# Patient Record
Sex: FEMALE | Race: WHITE | NOT HISPANIC OR LATINO
[De-identification: names, ages, dates, MRNs, and addresses within clinical notes are randomized per-mention and may not be internally consistent; named-entity substitution may affect disease eponyms.]

---

## 2019-02-20 ENCOUNTER — RESULT REVIEW (OUTPATIENT)
Age: 24
End: 2019-02-20

## 2020-11-30 ENCOUNTER — APPOINTMENT (OUTPATIENT)
Dept: SURGICAL ONCOLOGY | Facility: CLINIC | Age: 25
End: 2020-11-30
Payer: COMMERCIAL

## 2020-11-30 VITALS
HEART RATE: 74 BPM | OXYGEN SATURATION: 98 % | BODY MASS INDEX: 19.29 KG/M2 | RESPIRATION RATE: 16 BRPM | TEMPERATURE: 98.3 F | WEIGHT: 120 LBS | DIASTOLIC BLOOD PRESSURE: 69 MMHG | HEIGHT: 66 IN | SYSTOLIC BLOOD PRESSURE: 104 MMHG

## 2020-11-30 PROCEDURE — 99072 ADDL SUPL MATRL&STAF TM PHE: CPT

## 2020-11-30 PROCEDURE — 99203 OFFICE O/P NEW LOW 30 MIN: CPT

## 2020-11-30 NOTE — HISTORY OF PRESENT ILLNESS
[de-identified] : 26yo previously healthy female that presented with acute onset LUQ back pain.  She denies trauma but is an avid exerciser and runner.  She underwent an evalution with Ultrasound, CT and MRI.  Imaging revealed a splenic cyst just under 5cm, but with signs of recent hemmorhage.  No extravasation.  Her pain is still somewhat severe but has dissipated somewhat in the past week.  She denies nausea or weight loss.  She also denies foreign travel, fevers or chills.

## 2020-11-30 NOTE — REASON FOR VISIT
[Initial Consultation] : an initial consultation for [Parent] : parent [FreeTextEntry2] : Splenic Cyst

## 2020-11-30 NOTE — ASSESSMENT
[FreeTextEntry1] : 24 yo previously healthy female that presents with a severely symptomatic splenic cyst.  This cyst appears benign and I have no suspician that it is parasitic based on history and imaging.  In reviewing imaging, my guess is that she had minor hemmorhage that resulted in rapid increase in size and pain.  I explained that the indications for surgery are size >5cm or symptoms.  Given that she clearly is having symptoms and likely had recent bleeding, I think she should under fenestration of the cyst.  We discussed that cyst aspiration has a very high recurrence rate.  We also discussed that it is possible that we would need to convert to an open operation or remove the entire spleen, depending on what we found at the time of surgery.  The cyst is located very posterior but I think a fenestration is possible.\par I spent 30 minutes with the patient and her mother.  We are all in agreement with proceeding.  All of their questions were answered and we will schedule surgery soon, since she is symptomatic.

## 2020-11-30 NOTE — HISTORY OF PRESENT ILLNESS
[de-identified] : 26yo previously healthy female that presented with acute onset LUQ back pain.  She denies trauma but is an avid exerciser and runner.  She underwent an evalution with Ultrasound, CT and MRI.  Imaging revealed a splenic cyst just under 5cm, but with signs of recent hemmorhage.  No extravasation.  Her pain is still somewhat severe but has dissipated somewhat in the past week.  She denies nausea or weight loss.  She also denies foreign travel, fevers or chills.

## 2020-12-02 ENCOUNTER — OUTPATIENT (OUTPATIENT)
Dept: OUTPATIENT SERVICES | Facility: HOSPITAL | Age: 25
LOS: 1 days | End: 2020-12-02

## 2020-12-02 VITALS
SYSTOLIC BLOOD PRESSURE: 117 MMHG | WEIGHT: 117.95 LBS | DIASTOLIC BLOOD PRESSURE: 62 MMHG | TEMPERATURE: 99 F | HEIGHT: 66 IN | OXYGEN SATURATION: 98 % | RESPIRATION RATE: 14 BRPM | HEART RATE: 60 BPM

## 2020-12-02 DIAGNOSIS — D73.4 CYST OF SPLEEN: ICD-10-CM

## 2020-12-02 DIAGNOSIS — Z98.890 OTHER SPECIFIED POSTPROCEDURAL STATES: Chronic | ICD-10-CM

## 2020-12-02 LAB
BLD GP AB SCN SERPL QL: NEGATIVE — SIGNIFICANT CHANGE UP
HCG SERPL-ACNC: < 5 MIU/ML — SIGNIFICANT CHANGE UP
RH IG SCN BLD-IMP: POSITIVE — SIGNIFICANT CHANGE UP

## 2020-12-02 RX ORDER — SODIUM CHLORIDE 9 MG/ML
1000 INJECTION, SOLUTION INTRAVENOUS
Refills: 0 | Status: DISCONTINUED | OUTPATIENT
Start: 2020-12-07 | End: 2020-12-08

## 2020-12-02 NOTE — H&P PST ADULT - NSANTHOSAYNRD_GEN_A_CORE
No. GEORGI screening performed.  STOP BANG Legend: 0-2 = LOW Risk; 3-4 = INTERMEDIATE Risk; 5-8 = HIGH Risk

## 2020-12-02 NOTE — H&P PST ADULT - GASTROINTESTINAL DETAILS
no organomegaly/no distention/soft/no bruit/no guarding/no rebound tenderness/no rigidity/no masses palpable/nontender/bowel sounds normal

## 2020-12-02 NOTE — H&P PST ADULT - NEGATIVE NEUROLOGICAL SYMPTOMS
no loss of consciousness/no tremors/no hemiparesis/no facial palsy/no headache/no transient paralysis/no weakness/no paresthesias/no syncope/no confusion/no vertigo/no difficulty walking/no focal seizures/no loss of sensation

## 2020-12-02 NOTE — H&P PST ADULT - RS GEN PE MLT RESP DETAILS PC
clear to auscultation bilaterally/breath sounds equal/respirations non-labored/no intercostal retractions/no rales/good air movement/no rhonchi/no wheezes/no chest wall tenderness

## 2020-12-02 NOTE — H&P PST ADULT - NEGATIVE GENERAL GENITOURINARY SYMPTOMS
no hematuria/no flank pain R/no bladder infections/no urinary hesitancy/no dysuria/no flank pain L/normal urinary frequency

## 2020-12-02 NOTE — H&P PST ADULT - NEGATIVE CARDIOVASCULAR SYMPTOMS
no palpitations/no dyspnea on exertion/no orthopnea/no paroxysmal nocturnal dyspnea/no claudication/no chest pain/no peripheral edema

## 2020-12-02 NOTE — H&P PST ADULT - NEGATIVE GENERAL SYMPTOMS
no anorexia/no weight gain/no polyphagia/no polydipsia/no malaise/no fever/no chills/no sweating/no weight loss/no polyuria/no fatigue

## 2020-12-02 NOTE — H&P PST ADULT - NSICDXPROBLEM_GEN_ALL_CORE_FT
PROBLEM DIAGNOSES  Problem: Cyst of spleen  Assessment and Plan: Pt scheduled for robotic assisted splenic cyst fenestration, possible splenectomy on 12/7/2020.  labs done results pending, labs results from [facility] in chart.  Preop covid testing scheduled.  urine cup provided. Hibclens provided, written and verbal instructions given with teach back, pt able to verbalize understanding.  Preop teaching done, pt able to verbalize undrestanding.   meds day fo procedure pepcid

## 2020-12-02 NOTE — H&P PST ADULT - NEGATIVE BREAST SYMPTOMS
no breast lump L/no breast tenderness R/no breast lump R/no nipple discharge L/no nipple discharge R/no breast tenderness L

## 2020-12-04 ENCOUNTER — APPOINTMENT (OUTPATIENT)
Dept: SURGICAL ONCOLOGY | Facility: CLINIC | Age: 25
End: 2020-12-04

## 2020-12-04 DIAGNOSIS — Z01.818 ENCOUNTER FOR OTHER PREPROCEDURAL EXAMINATION: ICD-10-CM

## 2020-12-05 LAB — SARS-COV-2 N GENE NPH QL NAA+PROBE: NOT DETECTED

## 2020-12-06 ENCOUNTER — TRANSCRIPTION ENCOUNTER (OUTPATIENT)
Age: 25
End: 2020-12-06

## 2020-12-07 ENCOUNTER — RESULT REVIEW (OUTPATIENT)
Age: 25
End: 2020-12-07

## 2020-12-07 ENCOUNTER — INPATIENT (INPATIENT)
Facility: HOSPITAL | Age: 25
LOS: 1 days | Discharge: HOME | End: 2020-12-09
Attending: SURGERY | Admitting: SURGERY
Payer: COMMERCIAL

## 2020-12-07 ENCOUNTER — APPOINTMENT (OUTPATIENT)
Dept: SURGICAL ONCOLOGY | Facility: HOSPITAL | Age: 25
End: 2020-12-07

## 2020-12-07 VITALS
OXYGEN SATURATION: 97 % | RESPIRATION RATE: 17 BRPM | DIASTOLIC BLOOD PRESSURE: 81 MMHG | HEART RATE: 71 BPM | SYSTOLIC BLOOD PRESSURE: 146 MMHG | TEMPERATURE: 99 F

## 2020-12-07 DIAGNOSIS — D73.4 CYST OF SPLEEN: ICD-10-CM

## 2020-12-07 DIAGNOSIS — Z98.890 OTHER SPECIFIED POSTPROCEDURAL STATES: Chronic | ICD-10-CM

## 2020-12-07 LAB — HCG UR QL: NEGATIVE — SIGNIFICANT CHANGE UP

## 2020-12-07 PROCEDURE — S2900 ROBOTIC SURGICAL SYSTEM: CPT | Mod: NC

## 2020-12-07 PROCEDURE — 88305 TISSUE EXAM BY PATHOLOGIST: CPT | Mod: 26

## 2020-12-07 PROCEDURE — 38101 REMOVAL OF SPLEEN PARTIAL: CPT

## 2020-12-07 RX ORDER — OXYCODONE HYDROCHLORIDE 5 MG/1
5 TABLET ORAL EVERY 6 HOURS
Refills: 0 | Status: DISCONTINUED | OUTPATIENT
Start: 2020-12-07 | End: 2020-12-07

## 2020-12-07 RX ORDER — ACETAMINOPHEN 500 MG
650 TABLET ORAL EVERY 6 HOURS
Refills: 0 | Status: DISCONTINUED | OUTPATIENT
Start: 2020-12-07 | End: 2020-12-07

## 2020-12-07 RX ORDER — OXYCODONE HYDROCHLORIDE 5 MG/1
5 TABLET ORAL EVERY 4 HOURS
Refills: 0 | Status: DISCONTINUED | OUTPATIENT
Start: 2020-12-07 | End: 2020-12-07

## 2020-12-07 RX ORDER — OXYCODONE HYDROCHLORIDE 5 MG/1
5 TABLET ORAL EVERY 6 HOURS
Refills: 0 | Status: DISCONTINUED | OUTPATIENT
Start: 2020-12-07 | End: 2020-12-09

## 2020-12-07 RX ORDER — FENTANYL CITRATE 50 UG/ML
50 INJECTION INTRAVENOUS
Refills: 0 | Status: DISCONTINUED | OUTPATIENT
Start: 2020-12-07 | End: 2020-12-07

## 2020-12-07 RX ORDER — ACETAMINOPHEN 500 MG
650 TABLET ORAL EVERY 6 HOURS
Refills: 0 | Status: DISCONTINUED | OUTPATIENT
Start: 2020-12-07 | End: 2020-12-08

## 2020-12-07 RX ORDER — KETOROLAC TROMETHAMINE 30 MG/ML
15 SYRINGE (ML) INJECTION EVERY 6 HOURS
Refills: 0 | Status: DISCONTINUED | OUTPATIENT
Start: 2020-12-07 | End: 2020-12-07

## 2020-12-07 RX ORDER — KETOROLAC TROMETHAMINE 30 MG/ML
15 SYRINGE (ML) INJECTION ONCE
Refills: 0 | Status: DISCONTINUED | OUTPATIENT
Start: 2020-12-07 | End: 2020-12-07

## 2020-12-07 RX ORDER — OXYCODONE HYDROCHLORIDE 5 MG/1
5 TABLET ORAL ONCE
Refills: 0 | Status: DISCONTINUED | OUTPATIENT
Start: 2020-12-07 | End: 2020-12-07

## 2020-12-07 RX ORDER — HYDROMORPHONE HYDROCHLORIDE 2 MG/ML
0.5 INJECTION INTRAMUSCULAR; INTRAVENOUS; SUBCUTANEOUS
Refills: 0 | Status: DISCONTINUED | OUTPATIENT
Start: 2020-12-07 | End: 2020-12-07

## 2020-12-07 RX ORDER — ENOXAPARIN SODIUM 100 MG/ML
40 INJECTION SUBCUTANEOUS DAILY
Refills: 0 | Status: DISCONTINUED | OUTPATIENT
Start: 2020-12-08 | End: 2020-12-09

## 2020-12-07 RX ADMIN — OXYCODONE HYDROCHLORIDE 5 MILLIGRAM(S): 5 TABLET ORAL at 13:30

## 2020-12-07 RX ADMIN — Medication 15 MILLIGRAM(S): at 23:57

## 2020-12-07 RX ADMIN — OXYCODONE HYDROCHLORIDE 5 MILLIGRAM(S): 5 TABLET ORAL at 13:00

## 2020-12-07 RX ADMIN — Medication 650 MILLIGRAM(S): at 17:30

## 2020-12-07 RX ADMIN — HYDROMORPHONE HYDROCHLORIDE 0.5 MILLIGRAM(S): 2 INJECTION INTRAMUSCULAR; INTRAVENOUS; SUBCUTANEOUS at 13:30

## 2020-12-07 RX ADMIN — OXYCODONE HYDROCHLORIDE 5 MILLIGRAM(S): 5 TABLET ORAL at 16:53

## 2020-12-07 RX ADMIN — HYDROMORPHONE HYDROCHLORIDE 0.5 MILLIGRAM(S): 2 INJECTION INTRAMUSCULAR; INTRAVENOUS; SUBCUTANEOUS at 13:15

## 2020-12-07 RX ADMIN — FENTANYL CITRATE 50 MICROGRAM(S): 50 INJECTION INTRAVENOUS at 13:45

## 2020-12-07 RX ADMIN — OXYCODONE HYDROCHLORIDE 5 MILLIGRAM(S): 5 TABLET ORAL at 19:56

## 2020-12-07 RX ADMIN — OXYCODONE HYDROCHLORIDE 5 MILLIGRAM(S): 5 TABLET ORAL at 17:23

## 2020-12-07 RX ADMIN — Medication 650 MILLIGRAM(S): at 18:00

## 2020-12-07 RX ADMIN — FENTANYL CITRATE 50 MICROGRAM(S): 50 INJECTION INTRAVENOUS at 13:30

## 2020-12-07 RX ADMIN — OXYCODONE HYDROCHLORIDE 5 MILLIGRAM(S): 5 TABLET ORAL at 21:27

## 2020-12-07 RX ADMIN — SODIUM CHLORIDE 75 MILLILITER(S): 9 INJECTION, SOLUTION INTRAVENOUS at 13:49

## 2020-12-07 RX ADMIN — SODIUM CHLORIDE 75 MILLILITER(S): 9 INJECTION, SOLUTION INTRAVENOUS at 19:56

## 2020-12-07 NOTE — CHART NOTE - NSCHARTNOTEFT_GEN_A_CORE
POST-OPERATIVE NOTE    Subjective:  Patient is s/p robotic splenic cyst fenestration. Patient is reporting 8/10 pain not relieved by Tylenol. States that she has increased pain with coughing and just felt a "popping sensation" near her midline incision. Denies any nausea or vomiting, tolerating clears. Denies any fevers, chills, chest pain, or SOB. Has not urinated yet but feels like she has to void. Has not passed any BM or flatus post operatively. Recovering appropriately.     Vital Signs Last 24 Hrs  T(C): 36.6 (07 Dec 2020 15:16), Max: 37.1 (07 Dec 2020 07:21)  T(F): 97.9 (07 Dec 2020 15:16), Max: 98.7 (07 Dec 2020 07:21)  HR: 74 (07 Dec 2020 15:16) (53 - 74)  BP: 118/62 (07 Dec 2020 15:16) (93/41 - 146/81)  BP(mean): 72 (07 Dec 2020 14:15) (68 - 77)  RR: 16 (07 Dec 2020 15:16) (10 - 20)  SpO2: 98% (07 Dec 2020 15:16) (97% - 100%)  I&O's Detail    07 Dec 2020 07:01  -  07 Dec 2020 18:19  --------------------------------------------------------  IN:    Lactated Ringers: 150 mL    Oral Fluid: 180 mL  Total IN: 330 mL    OUT:    Voided (mL): 300 mL  Total OUT: 300 mL    Total NET: 30 mL          PAST MEDICAL & SURGICAL HISTORY:  Cyst of spleen    ADD (attention deficit disorder)    Anxiety    S/P sinus surgery  balloon sinuplasty 2019          Physical Exam:  General: NAD, resting comfortably in bed  Pulmonary: Nonlabored breathing, no respiratory distress  Cardiovascular: NSR  Abdominal: soft, ND, appropriately tender at incision sites, midline incision site with some strikethrough bleeding.   Extremities: WWP        Assessment:  The patient is a 25y Female who is now several hours post-op from a robotic splenic cyst fenestration.     Plan:  - Pain control, prn tylenol q6, prn oxy 5 q6, prn toradol 15 q6 for severe pain  - DVT ppx  - CLD - advance as tolerated  - OOB and ambulating as tolerated  - F/u AM labs  - f/u trial of void      D Team Surgery [id number]

## 2020-12-08 LAB
ANION GAP SERPL CALC-SCNC: 10 MMOL/L — SIGNIFICANT CHANGE UP (ref 7–14)
BUN SERPL-MCNC: 8 MG/DL — SIGNIFICANT CHANGE UP (ref 7–23)
CALCIUM SERPL-MCNC: 9.5 MG/DL — SIGNIFICANT CHANGE UP (ref 8.4–10.5)
CHLORIDE SERPL-SCNC: 104 MMOL/L — SIGNIFICANT CHANGE UP (ref 98–107)
CK MB BLD-MCNC: 2.3 % — SIGNIFICANT CHANGE UP (ref 0–2.5)
CK MB CFR SERPL CALC: 2.1 NG/ML — SIGNIFICANT CHANGE UP
CK SERPL-CCNC: 93 U/L — SIGNIFICANT CHANGE UP (ref 25–170)
CO2 SERPL-SCNC: 27 MMOL/L — SIGNIFICANT CHANGE UP (ref 22–31)
CREAT SERPL-MCNC: 0.66 MG/DL — SIGNIFICANT CHANGE UP (ref 0.5–1.3)
GLUCOSE SERPL-MCNC: 119 MG/DL — HIGH (ref 70–99)
HCT VFR BLD CALC: 39.2 % — SIGNIFICANT CHANGE UP (ref 34.5–45)
HGB BLD-MCNC: 12.8 G/DL — SIGNIFICANT CHANGE UP (ref 11.5–15.5)
MAGNESIUM SERPL-MCNC: 1.7 MG/DL — SIGNIFICANT CHANGE UP (ref 1.6–2.6)
MCHC RBC-ENTMCNC: 31.6 PG — SIGNIFICANT CHANGE UP (ref 27–34)
MCHC RBC-ENTMCNC: 32.7 GM/DL — SIGNIFICANT CHANGE UP (ref 32–36)
MCV RBC AUTO: 96.8 FL — SIGNIFICANT CHANGE UP (ref 80–100)
NRBC # BLD: 0 /100 WBCS — SIGNIFICANT CHANGE UP
NRBC # FLD: 0 K/UL — SIGNIFICANT CHANGE UP
PHOSPHATE SERPL-MCNC: 3.4 MG/DL — SIGNIFICANT CHANGE UP (ref 2.5–4.5)
PLATELET # BLD AUTO: 253 K/UL — SIGNIFICANT CHANGE UP (ref 150–400)
POTASSIUM SERPL-MCNC: 3.7 MMOL/L — SIGNIFICANT CHANGE UP (ref 3.5–5.3)
POTASSIUM SERPL-SCNC: 3.7 MMOL/L — SIGNIFICANT CHANGE UP (ref 3.5–5.3)
RBC # BLD: 4.05 M/UL — SIGNIFICANT CHANGE UP (ref 3.8–5.2)
RBC # FLD: 11.8 % — SIGNIFICANT CHANGE UP (ref 10.3–14.5)
SODIUM SERPL-SCNC: 141 MMOL/L — SIGNIFICANT CHANGE UP (ref 135–145)
TROPONIN T, HIGH SENSITIVITY RESULT: <6 NG/L — SIGNIFICANT CHANGE UP
WBC # BLD: 12.47 K/UL — HIGH (ref 3.8–10.5)
WBC # FLD AUTO: 12.47 K/UL — HIGH (ref 3.8–10.5)

## 2020-12-08 RX ORDER — IBUPROFEN 200 MG
400 TABLET ORAL EVERY 6 HOURS
Refills: 0 | Status: DISCONTINUED | OUTPATIENT
Start: 2020-12-08 | End: 2020-12-09

## 2020-12-08 RX ORDER — CLONAZEPAM 1 MG
0.5 TABLET ORAL DAILY
Refills: 0 | Status: DISCONTINUED | OUTPATIENT
Start: 2020-12-08 | End: 2020-12-09

## 2020-12-08 RX ORDER — LIDOCAINE 4 G/100G
1 CREAM TOPICAL DAILY
Refills: 0 | Status: DISCONTINUED | OUTPATIENT
Start: 2020-12-08 | End: 2020-12-09

## 2020-12-08 RX ORDER — MAGNESIUM SULFATE 500 MG/ML
2 VIAL (ML) INJECTION ONCE
Refills: 0 | Status: COMPLETED | OUTPATIENT
Start: 2020-12-08 | End: 2020-12-08

## 2020-12-08 RX ORDER — ACETAMINOPHEN 500 MG
975 TABLET ORAL EVERY 6 HOURS
Refills: 0 | Status: DISCONTINUED | OUTPATIENT
Start: 2020-12-08 | End: 2020-12-09

## 2020-12-08 RX ADMIN — LIDOCAINE 1 PATCH: 4 CREAM TOPICAL at 19:43

## 2020-12-08 RX ADMIN — OXYCODONE HYDROCHLORIDE 5 MILLIGRAM(S): 5 TABLET ORAL at 12:54

## 2020-12-08 RX ADMIN — Medication 400 MILLIGRAM(S): at 18:24

## 2020-12-08 RX ADMIN — OXYCODONE HYDROCHLORIDE 5 MILLIGRAM(S): 5 TABLET ORAL at 06:24

## 2020-12-08 RX ADMIN — Medication 975 MILLIGRAM(S): at 13:32

## 2020-12-08 RX ADMIN — Medication 975 MILLIGRAM(S): at 18:24

## 2020-12-08 RX ADMIN — ENOXAPARIN SODIUM 40 MILLIGRAM(S): 100 INJECTION SUBCUTANEOUS at 12:24

## 2020-12-08 RX ADMIN — LIDOCAINE 1 PATCH: 4 CREAM TOPICAL at 13:33

## 2020-12-08 RX ADMIN — OXYCODONE HYDROCHLORIDE 5 MILLIGRAM(S): 5 TABLET ORAL at 07:46

## 2020-12-08 RX ADMIN — OXYCODONE HYDROCHLORIDE 5 MILLIGRAM(S): 5 TABLET ORAL at 18:54

## 2020-12-08 RX ADMIN — Medication 0.5 MILLIGRAM(S): at 10:42

## 2020-12-08 RX ADMIN — Medication 975 MILLIGRAM(S): at 18:54

## 2020-12-08 RX ADMIN — OXYCODONE HYDROCHLORIDE 5 MILLIGRAM(S): 5 TABLET ORAL at 18:24

## 2020-12-08 RX ADMIN — Medication 400 MILLIGRAM(S): at 18:54

## 2020-12-08 RX ADMIN — Medication 50 GRAM(S): at 13:33

## 2020-12-08 RX ADMIN — OXYCODONE HYDROCHLORIDE 5 MILLIGRAM(S): 5 TABLET ORAL at 12:24

## 2020-12-08 RX ADMIN — Medication 15 MILLIGRAM(S): at 01:30

## 2020-12-08 NOTE — PROGRESS NOTE ADULT - SUBJECTIVE AND OBJECTIVE BOX
GENERAL SURGERY DAILY PROGRESS NOTE:    Interval:  Passed trial of void.    Subjective:  Patient seen and examined. Reports pain is well controlled. Denies N/V, and fevers and chills. Voiding. Tolerating clear diet. ?? flatus, ??BM.    Vital Signs Last 24 Hrs  T(C): 36.4 (07 Dec 2020 23:51), Max: 37.1 (07 Dec 2020 07:21)  T(F): 97.6 (07 Dec 2020 23:51), Max: 98.7 (07 Dec 2020 07:21)  HR: 54 (07 Dec 2020 23:51) (53 - 74)  BP: 108/64 (07 Dec 2020 23:51) (93/41 - 146/81)  BP(mean): 72 (07 Dec 2020 14:15) (68 - 77)  RR: 17 (07 Dec 2020 23:51) (10 - 20)  SpO2: 100% (07 Dec 2020 23:51) (97% - 100%)    Exam:  Gen: NAD, resting in bed, alert and responding appropriately  Resp: Airway patent, non-labored respirations  Abd: Soft, ND, NT, no rebound or guarding. Incisions intact, midline incison with some strikethrough bleeding  Ext: No edema, WWP  Neuro: AAOx3, no focal deficits    I&O's Detail    07 Dec 2020 07:01  -  08 Dec 2020 01:09  --------------------------------------------------------  IN:    Lactated Ringers: 300 mL    Oral Fluid: 180 mL  Total IN: 480 mL    OUT:    Voided (mL): 700 mL  Total OUT: 700 mL    Total NET: -220 mL          Daily Height in cm: 167.64 (07 Dec 2020 09:50)    Daily     MEDICATIONS  (STANDING):  enoxaparin Injectable 40 milliGRAM(s) SubCutaneous daily  lactated ringers. 1000 milliLiter(s) (75 mL/Hr) IV Continuous <Continuous>    MEDICATIONS  (PRN):  acetaminophen   Tablet .. 650 milliGRAM(s) Oral every 6 hours PRN Mild Pain (1 - 3)  oxyCODONE    IR 5 milliGRAM(s) Oral every 6 hours PRN Moderate Pain (4 - 6)      LABS:                   D TEAM SURGERY DAILY PROGRESS NOTE:    Interval:  Passed trial of void.    Subjective:  Patient seen and examined. Reports pain is well controlled. Denies N/V, and fevers and chills. Voiding. Tolerating clear diet, would like more substantial food. Passing flatus, no bowel movement. Denies chest pain/SOB. Denies nausea/emesis.    Vital Signs Last 24 Hrs  T(C): 36.4 (07 Dec 2020 23:51), Max: 37.1 (07 Dec 2020 07:21)  T(F): 97.6 (07 Dec 2020 23:51), Max: 98.7 (07 Dec 2020 07:21)  HR: 54 (07 Dec 2020 23:51) (53 - 74)  BP: 108/64 (07 Dec 2020 23:51) (93/41 - 146/81)  BP(mean): 72 (07 Dec 2020 14:15) (68 - 77)  RR: 17 (07 Dec 2020 23:51) (10 - 20)  SpO2: 100% (07 Dec 2020 23:51) (97% - 100%)    Exam:  Gen: NAD, resting in bed, alert and responding appropriately  Resp: Airway patent, non-labored respirations  Abd: Soft, ND, NT, no rebound or guarding. Incisions intact, midline incision with some sanguinous strikethrough  Ext: No edema, WWP  Neuro: AAOx3, no focal deficits    I&O's Detail    07 Dec 2020 07:01  -  08 Dec 2020 01:09  --------------------------------------------------------  IN:    Lactated Ringers: 300 mL    Oral Fluid: 180 mL  Total IN: 480 mL    OUT:    Voided (mL): 700 mL  Total OUT: 700 mL    Total NET: -220 mL      Daily Height in cm: 167.64 (07 Dec 2020 09:50)    Daily     MEDICATIONS  (STANDING):  enoxaparin Injectable 40 milliGRAM(s) SubCutaneous daily  lactated ringers. 1000 milliLiter(s) (75 mL/Hr) IV Continuous <Continuous>    MEDICATIONS  (PRN):  acetaminophen   Tablet .. 650 milliGRAM(s) Oral every 6 hours PRN Mild Pain (1 - 3)  oxyCODONE    IR 5 milliGRAM(s) Oral every 6 hours PRN Moderate Pain (4 - 6)      LABS:

## 2020-12-08 NOTE — PROGRESS NOTE ADULT - ASSESSMENT
25F POD1 s/p robotic splenic cyst fenestration. Patient is recovering appropriately.     Plan:  - Pain control w 15mg Toradol q6 for breakthrough  - Regular diet   - DVT ppx  - OOB and ambulating  - f/u AM labs  - Dispo Planning      D team Surgery [id number] 25F POD1 s/p robotic splenic cyst fenestration. Patient is recovering appropriately.     Plan:  - Pain control w 15mg Toradol q6 for breakthrough  - Regular diet   - DVT ppx  - OOB and ambulating  - f/u AM labs  - Discharge planning      D team Surgery [id number]

## 2020-12-08 NOTE — CHART NOTE - NSCHARTNOTEFT_GEN_A_CORE
CAPRINI SCORE    AGE RELATED RISK FACTORS                                                             [ ] Age 41-60 years                                            (1 Point)  [ ] Age: 61-74 years                                           (2 Points)                 [ ] Age= 75 years                                                (3 Points)             DISEASE RELATED RISK FACTORS                                                       [ ] Edema in the lower extremities                 (1 Point)                     [ ] Varicose veins                                               (1 Point)                                 [ ] BMI > 25 Kg/m2                                            (1 Point)                                  [ ] Serious infection (ie PNA)                            (1 Point)                     [ ] Lung disease ( COPD, Emphysema)            (1 Point)                                                                          [ ] Acute myocardial infarction                         (1 Point)                  [ ] Congestive heart failure (in the previous month)  (1 Point)         [ ] Inflammatory bowel disease                            (1 Point)                  [ ] Central venous access, PICC or Port               (2 points)       (within the last month)                                                                [ ] Stroke (in the previous month)                        (5 Points)    [ ] Previous or present malignancy                       (2 points)                                                                                                                                                         HEMATOLOGY RELATED FACTORS                                                         [ ] Prior episodes of VTE                                     (3 Points)                     [ ] Positive family history for VTE                      (3 Points)                  [ ] Prothrombin 09245 A                                     (3 Points)                     [ ] Factor V Leiden                                                (3 Points)                        [ ] Lupus anticoagulants                                      (3 Points)                                                           [ ] Anticardiolipin antibodies                              (3 Points)                                                       [ ] High homocysteine in the blood                   (3 Points)                                             [ ] Other congenital or acquired thrombophilia      (3 Points)                                                [ ] Heparin induced thrombocytopenia                  (3 Points)                                        MOBILITY RELATED FACTORS  [ ] Bed rest                                                         (1 Point)  [ ] Plaster cast                                                    (2 points)  [ ] Bed bound for more than 72 hours           (2 Points)    GENDER SPECIFIC FACTORS  [ ] Pregnancy or had a baby within the last month   (1 Point)  [ ] Post-partum < 6 weeks                                   (1 Point)  [ ] Hormonal therapy  or oral contraception   (1 Point)  [ ] History of pregnancy complications              (1 point)  [ ] Unexplained or recurrent              (1 Point)    OTHER RISK FACTORS                                           (1 Point)  BMI >40, smoking, diabetes requiring insulin, chemotherapy  blood transfusions and length of surgery over 2 hours    SURGERY RELATED RISK FACTORS  [ ]  Section within the last month     (1 Point)  [ ] Minor surgery                                                  (1 Point)  [ ] Arthroscopic surgery                                       (2 Points)  [ ] Planned major surgery lasting more            (2 Points)      than 45 minutes     [ ] Elective hip or knee joint replacement       (5 points)       surgery                                                TRAUMA RELATED RISK FACTORS  [ ] Fracture of the hip, pelvis, or leg                       (5 Points)  [ ] Spinal cord injury resulting in paralysis             (5 points)       (in the previous month)    [ ] Paralysis  (less than 1 month)                             (5 Points)  [ ] Multiple Trauma within 1 month                        (5 Points)    Total Score [    0    ]    Caprini Score 0-2: Low Risk, NO VTE prophylaxis required for most patients, encourage ambulation  Caprini Score 3-6: Moderate Risk , pharmacologic VTE prophylaxis is indicated for most patients (in the absence of contraindications)  Caprini Score Greater than or =7: High risk, pharmocologic VTE prophylaxis indicated for mot patients (in the absence of contraindications) CAPRINI SCORE    AGE RELATED RISK FACTORS                                                             [ ] Age 41-60 years                                            (1 Point)  [ ] Age: 61-74 years                                           (2 Points)                 [ ] Age= 75 years                                                (3 Points)             DISEASE RELATED RISK FACTORS                                                       [ ] Edema in the lower extremities                 (1 Point)                     [ ] Varicose veins                                               (1 Point)                                 [ ] BMI > 25 Kg/m2                                            (1 Point)                                  [ ] Serious infection (ie PNA)                            (1 Point)                     [ ] Lung disease ( COPD, Emphysema)            (1 Point)                                                                          [ ] Acute myocardial infarction                         (1 Point)                  [ ] Congestive heart failure (in the previous month)  (1 Point)         [ ] Inflammatory bowel disease                            (1 Point)                  [ ] Central venous access, PICC or Port               (2 points)       (within the last month)                                                                [ ] Stroke (in the previous month)                        (5 Points)    [ ] Previous or present malignancy                       (2 points)                                                                                                                                                         HEMATOLOGY RELATED FACTORS                                                         [ ] Prior episodes of VTE                                     (3 Points)                     [ ] Positive family history for VTE                      (3 Points)                  [ ] Prothrombin 27437 A                                     (3 Points)                     [ ] Factor V Leiden                                                (3 Points)                        [ ] Lupus anticoagulants                                      (3 Points)                                                           [ ] Anticardiolipin antibodies                              (3 Points)                                                       [ ] High homocysteine in the blood                   (3 Points)                                             [ ] Other congenital or acquired thrombophilia      (3 Points)                                                [ ] Heparin induced thrombocytopenia                  (3 Points)                                        MOBILITY RELATED FACTORS  [ ] Bed rest                                                         (1 Point)  [ ] Plaster cast                                                    (2 points)  [ ] Bed bound for more than 72 hours           (2 Points)    GENDER SPECIFIC FACTORS  [ ] Pregnancy or had a baby within the last month   (1 Point)  [ ] Post-partum < 6 weeks                                   (1 Point)  [ ] Hormonal therapy  or oral contraception   (1 Point)  [ ] History of pregnancy complications              (1 point)  [ ] Unexplained or recurrent              (1 Point)    OTHER RISK FACTORS                                           (1 Point)  BMI >40, smoking, diabetes requiring insulin, chemotherapy  blood transfusions and length of surgery over 2 hours    SURGERY RELATED RISK FACTORS  [ ]  Section within the last month     (1 Point)  [ ] Minor surgery                                                  (1 Point)  [ ] Arthroscopic surgery                                       (2 Points)  [x] Planned major surgery lasting more            (2 Points)      than 45 minutes     [ ] Elective hip or knee joint replacement       (5 points)       surgery                                                TRAUMA RELATED RISK FACTORS  [ ] Fracture of the hip, pelvis, or leg                       (5 Points)  [ ] Spinal cord injury resulting in paralysis             (5 points)       (in the previous month)    [ ] Paralysis  (less than 1 month)                             (5 Points)  [ ] Multiple Trauma within 1 month                        (5 Points)    Total Score [   2    ]    Caprini Score 0-2: Low Risk, NO VTE prophylaxis required for most patients, encourage ambulation  Caprini Score 3-6: Moderate Risk , pharmacologic VTE prophylaxis is indicated for most patients (in the absence of contraindications)  Caprini Score Greater than or =7: High risk, pharmocologic VTE prophylaxis indicated for mot patients (in the absence of contraindications)

## 2020-12-08 NOTE — CHART NOTE - NSCHARTNOTEFT_GEN_A_CORE
Notified by RN that patient ambulated around the unit and developed chest, bilateral shoulder and neck pressure. In to examine patient who is sitting on the side of the bed, complaining of diffuse abdominal pain and states that she started to get ascending chest pressure which started below her diaphragm and extends to both shoulders and her neck. She was able to ambulate without difficulty, dizziness or shortness of breath. On exam pain mildly reproducible over the sternum, not reproducible to shoulders or neck. Continues passing flatus, no bowel movement. Reports improvement in pressure sensation laying semi-recumbent. Denies nausea/emesis. Denies headache/vision changes.    /61, HR 71, SpO2 100% on RA, RR 16    Physical Exam:  General: Awake, alert, anxious, no acute distress  HEENT: Neck full ROM without pain, EOMI intact  Respiratory: Clear bilaterally, equal bilateral expansion  Cardiovascular: s1/s2, regular rate and rhythm without murmur  Abdomen: Softly distended, tender in all four quadrants, incisions c/d/i with exception of umbilical site, serosang strikethrough   Extremities: Warm to touch, well perfused, moving and without edema    A/P: 25F POD1 s/p robotic splenic cyst fenestration, now complaining of chest, neck and shoulder pressure  - Check EKG  - AM labs/troponin/CK/CKMB  - Does not appear cardiac in nature, will r/o ACS  - Pain control for abdominal discomfort with acetaminophen/oxycodone  - Restart home Klonopin  - Continue regular diet    VALENT[facility] team Surgery [id number]

## 2020-12-09 ENCOUNTER — TRANSCRIPTION ENCOUNTER (OUTPATIENT)
Age: 25
End: 2020-12-09

## 2020-12-09 VITALS
HEART RATE: 69 BPM | DIASTOLIC BLOOD PRESSURE: 55 MMHG | SYSTOLIC BLOOD PRESSURE: 105 MMHG | RESPIRATION RATE: 17 BRPM | TEMPERATURE: 98 F | OXYGEN SATURATION: 100 %

## 2020-12-09 RX ORDER — LIDOCAINE 4 G/100G
1 CREAM TOPICAL
Qty: 0 | Refills: 0 | DISCHARGE
Start: 2020-12-09

## 2020-12-09 RX ORDER — OXYCODONE HYDROCHLORIDE 5 MG/1
1 TABLET ORAL
Qty: 12 | Refills: 0
Start: 2020-12-09 | End: 2020-12-11

## 2020-12-09 RX ORDER — ACETAMINOPHEN WITH CODEINE 300MG-30MG
1 TABLET ORAL
Qty: 0 | Refills: 0 | DISCHARGE

## 2020-12-09 RX ADMIN — Medication 975 MILLIGRAM(S): at 13:30

## 2020-12-09 RX ADMIN — OXYCODONE HYDROCHLORIDE 5 MILLIGRAM(S): 5 TABLET ORAL at 15:45

## 2020-12-09 RX ADMIN — LIDOCAINE 1 PATCH: 4 CREAM TOPICAL at 12:22

## 2020-12-09 RX ADMIN — Medication 975 MILLIGRAM(S): at 06:00

## 2020-12-09 RX ADMIN — Medication 975 MILLIGRAM(S): at 00:46

## 2020-12-09 RX ADMIN — LIDOCAINE 1 PATCH: 4 CREAM TOPICAL at 00:43

## 2020-12-09 RX ADMIN — ENOXAPARIN SODIUM 40 MILLIGRAM(S): 100 INJECTION SUBCUTANEOUS at 12:22

## 2020-12-09 RX ADMIN — OXYCODONE HYDROCHLORIDE 5 MILLIGRAM(S): 5 TABLET ORAL at 06:24

## 2020-12-09 RX ADMIN — Medication 975 MILLIGRAM(S): at 12:22

## 2020-12-09 RX ADMIN — Medication 400 MILLIGRAM(S): at 13:20

## 2020-12-09 RX ADMIN — Medication 400 MILLIGRAM(S): at 12:22

## 2020-12-09 RX ADMIN — Medication 400 MILLIGRAM(S): at 06:01

## 2020-12-09 NOTE — DISCHARGE NOTE PROVIDER - HOSPITAL COURSE
24y/o female who 11/2020 developed LUQ pain, MRI shows splenic cyst.  Now has mild discomfort. On 12/7 she underwent Robotic splenic cyst fenestration in the OR. The patient tolerated the procedure well. Post-operatively the patient was sent to the PACU. The patient was hemodynamically stable and was transferred to a surgical floor. The patient had daily wound care. The patient's pain was controlled by IV pain medications and then by PO pain medications. The patient was advanced to a regular diet and tolerated it well. The patient was placed on home medications. POD1 she complained of chest discomfort. EKG was unremarkable and cardiac enzymes were negative.     At the time of discharge, the patient was hemodynamically stable, was tolerating PO diet, was voiding urine and passing stool, was ambulating, and was comfortable with adequate pain control. The patient was instructed to follow up with Dr. Freire within 2 weeks after discharge from the hospital. The patient felt comfortable with discharge. The patient had no other issues.

## 2020-12-09 NOTE — DISCHARGE NOTE PROVIDER - NSDCFUADDINST_GEN_ALL_CORE_FT
BATHING: Please do not submerge wound underwater. You may shower and/or sponge bathe.  ACTIVITY: No heavy lifting or straining. Resume activities of daily living. Do not lift heavy weights (greater than 15 pounds) or engage in strenuous exercise until you see your doctor in the office in 1-2 weeks. You may shower today, just let the water run over the wound, no scrubbing. No immersion baths or swimming in pools or ocean until you see us in the office again. Please leave the steri-strips (small white bandaids) on. These will remain on and fall off by themselves in the next few weeks. If you are taking narcotic pain medication (such as Percocet), do NOT drive a car, operate machinery or make important decisions.  DIET: Return to your usual diet.  NOTIFY YOUR SURGEON IF: You have any bleeding that does not stop, any pus draining from your wound, any fever (over 100.4 F) or chills, persistent nausea/vomiting, persistent diarrhea, or if your pain is not controlled on your discharge pain medications.  FOLLOW-UP:  1. Please call to make a follow-up appointment within one week of discharge  2. Please follow up with your primary care provider in one week regarding your hospitalization, please bring all discharge paperwork with you to this follow up appointment.

## 2020-12-09 NOTE — PROGRESS NOTE ADULT - SUBJECTIVE AND OBJECTIVE BOX
GENERAL SURGERY PROGRESS NOTE    Patient is a 24 y/o Female POD2 s/p robotic-assisted splenic fenestration for symptomatic splenic cyst.     POST OPERATIVE DAY #: 2      SUBJECTIVE    OVERNIGHT EVENTS: Patient developed chest, bilateral shoulder, and neck pressure after ambulating around the unit yesterday morning. Subsequent cardiac troponins were negative and EKG ordered to r/o ACS.       OBJECTIVE    VITALS  T(C): 36.4 (12-09-20 @ 00:11), Max: 36.7 (12-08-20 @ 15:58)  HR: 65 (12-09-20 @ 00:11) (65 - 71)  BP: 104/65 (12-09-20 @ 00:11) (99/47 - 126/75)  RR: 17 (12-09-20 @ 00:11) (16 - 18)  SpO2: 100% (12-09-20 @ 00:11) (99% - 100%)    INS & OUTS    12-07-20 @ 07:01  -  12-08-20 @ 07:00  --------------------------------------------------------  IN: 930 mL / OUT: 900 mL / NET: 30 mL    12-08-20 @ 07:01  -  12-09-20 @ 04:29  --------------------------------------------------------  IN: 0 mL / OUT: 600 mL / NET: -600 mL        PHYSICAL EXAM  General: Appears well, NAD.  Neuro: A&Ox3  Chest: Breathing comfortably, CTAB.  CV: RRR, No m/r/g.  Abdomen: Soft, NTND, No rebound or guarding. Dressings c/d/i.     LABS                        12.8   12.47 )-----------( 253      ( 08 Dec 2020 11:45 )             39.2     12-08    141  |  104  |  8   ----------------------------<  119<H>  3.7   |  27  |  0.66    Ca    9.5      08 Dec 2020 11:26  Phos  3.4     12-08  Mg     1.7     12-08            RADIOLOGY & ADDITIONAL STUDIES GENERAL SURGERY PROGRESS NOTE    POST OPERATIVE DAY #: 2    24HR EVENTS: Patient developed chest, bilateral shoulder, and neck pressure after ambulating around the unit yesterday morning. Cardiac workup negative and pain spontaneously resolved.     No acute events overnight.       SUBJECTIVE:  Feels better today and reports sharp abdominal pain from yesterday has resolved. Denies nausea, vomiting. Tolerating reg diet. Ambulating.      OBJECTIVE    VITALS  T(C): 36.4 (12-09-20 @ 00:11), Max: 36.7 (12-08-20 @ 15:58)  HR: 65 (12-09-20 @ 00:11) (65 - 71)  BP: 104/65 (12-09-20 @ 00:11) (99/47 - 126/75)  RR: 17 (12-09-20 @ 00:11) (16 - 18)  SpO2: 100% (12-09-20 @ 00:11) (99% - 100%)    INS & OUTS    12-07-20 @ 07:01  -  12-08-20 @ 07:00  --------------------------------------------------------  IN: 930 mL / OUT: 900 mL / NET: 30 mL    12-08-20 @ 07:01  -  12-09-20 @ 04:29  --------------------------------------------------------  IN: 0 mL / OUT: 600 mL / NET: -600 mL        PHYSICAL EXAM  General: Appears well, NAD.  Neuro: A&Ox3  Chest: Breathing comfortably  Abdomen: Soft, appropriately tender, No rebound or guarding. Dressings c/d/i.     LABS                        12.8   12.47 )-----------( 253      ( 08 Dec 2020 11:45 )             39.2     12-08    141  |  104  |  8   ----------------------------<  119<H>  3.7   |  27  |  0.66    Ca    9.5      08 Dec 2020 11:26  Phos  3.4     12-08  Mg     1.7     12-08

## 2020-12-09 NOTE — DISCHARGE NOTE PROVIDER - NSDCCPCAREPLAN_GEN_ALL_CORE_FT
PRINCIPAL DISCHARGE DIAGNOSIS  Diagnosis: Cyst of spleen  Assessment and Plan of Treatment: You had removal of your cyst. Recover from surgery.

## 2020-12-09 NOTE — DISCHARGE NOTE NURSING/CASE MANAGEMENT/SOCIAL WORK - PATIENT PORTAL LINK FT
You can access the FollowMyHealth Patient Portal offered by [facility] by registering at the following website: [email or web address]. By joining [employer]’s FollowMyHealth portal, you will also be able to view your health information using other applications (apps) compatible with our system.

## 2020-12-09 NOTE — DISCHARGE NOTE PROVIDER - NSDCMRMEDTOKEN_GEN_ALL_CORE_FT
acetaminophen-codeine #3: 1 tab(s) orally every 8 hours, As Needed  Adderall 10 mg oral tablet: 1 tab(s) orally once a day, As Needed  Advil 200 mg oral tablet: 2 tab(s) orally every 6 hours, As Needed  Ty premenstrual supplement: 2 gum orally once a day  KlonoPIN 0.5 mg oral tablet: 1 tab(s) orally once a day, As Needed  lidocaine 5% topical film: Apply topically to affected area  Tylenol 325 mg oral tablet: 2 tab(s) orally every 4 hours, As Needed

## 2020-12-09 NOTE — DISCHARGE NOTE PROVIDER - CARE PROVIDER_API CALL
Kishor Freire)  Surgery  [address], [place] [address]  Phone: [phone]  Follow Up Time:

## 2020-12-09 NOTE — SBIRT NOTE ADULT - NSSBIRTALCPOSREINDET_GEN_A_CORE
Pt receptive to engagement in consult and agreeable to reviewing healthy drinking guidelines.   Provided SBIRT services: Full screen Positive. Positive reinforcement provided given patient currently within healthy guidelines. Education materials reviewed and given to patient.

## 2020-12-09 NOTE — PROGRESS NOTE ADULT - ASSESSMENT
Patient is a 26 y/o Female POD2 s/p robotic-assisted splenic fenestration for symptomatic splenic cyst. Patient recovering well after noting chest/shoulder/neck pressure and diffuse abdominal pain yesterday morning.    PLAN:  - Regular diet.  - Pain management as indicated.  - DVT ppx.  - OOB and ambulating.  - F/u AM labs.  - D/c planning.   Patient is a 24 y/o Female POD2 s/p robotic-assisted splenic fenestration for symptomatic splenic cyst. Patient recovering well after noting chest/shoulder/neck pressure and diffuse abdominal pain yesterday morning.    PLAN:  - Regular diet.  - Pain management as indicated.  - Restarted on home Klonopin.  - DVT ppx.  - OOB and ambulating.  - F/u EKG.  - F/u AM labs.  - D/c planning.   24 y/o woman POD2 s/p robotic-assisted splenic fenestration for symptomatic splenic cyst. Patient recovering well and tolerating a regular diet with good pain control.      PLAN:  - Regular diet  - Pain control as needed  - Continue home klonopin  - DVT ppx.  - OOB and ambulating.  - No AM labs  - Dispo: dc home today    D Team Surgery, [id number]

## 2020-12-09 NOTE — SBIRT NOTE ADULT - NSSBIRTDRGPOSREINDET_GEN_A_CORE
Pt AAOx3, euthymic mood and congruent affect, speech clear and concise, behavior appropriate to this writer. Full screen positive. Brief Intervention Performed. Passive Referral To Treatment Attempted. Screening results were reviewed with the patient and patient was provided information about healthy guidelines and potential negative consequences associated with level of risk. Motivation and readiness to reduce or stop use was discussed and goals and activities to make changes were suggested/offered. Options discussed for further evaluation and treatment, but referral to treatment was not completed because: Patient refused

## 2020-12-11 ENCOUNTER — TRANSCRIPTION ENCOUNTER (OUTPATIENT)
Age: 25
End: 2020-12-11

## 2020-12-12 PROBLEM — F98.8 OTHER SPECIFIED BEHAVIORAL AND EMOTIONAL DISORDERS WITH ONSET USUALLY OCCURRING IN CHILDHOOD AND ADOLESCENCE: Chronic | Status: ACTIVE | Noted: 2020-12-02

## 2020-12-12 PROBLEM — D73.4 CYST OF SPLEEN: Chronic | Status: ACTIVE | Noted: 2020-12-02

## 2020-12-12 PROBLEM — F41.9 ANXIETY DISORDER, UNSPECIFIED: Chronic | Status: ACTIVE | Noted: 2020-12-02

## 2020-12-15 ENCOUNTER — APPOINTMENT (OUTPATIENT)
Dept: SURGICAL ONCOLOGY | Facility: CLINIC | Age: 25
End: 2020-12-15
Payer: COMMERCIAL

## 2020-12-15 VITALS
WEIGHT: 112 LBS | OXYGEN SATURATION: 98 % | HEART RATE: 63 BPM | DIASTOLIC BLOOD PRESSURE: 71 MMHG | SYSTOLIC BLOOD PRESSURE: 111 MMHG | HEIGHT: 66 IN | BODY MASS INDEX: 18 KG/M2

## 2020-12-15 DIAGNOSIS — D73.4 CYST OF SPLEEN: ICD-10-CM

## 2020-12-15 PROCEDURE — 99024 POSTOP FOLLOW-UP VISIT: CPT

## 2020-12-17 LAB — SURGICAL PATHOLOGY STUDY: SIGNIFICANT CHANGE UP

## 2020-12-18 PROBLEM — D73.4 SPLENIC CYST: Status: ACTIVE | Noted: 2020-11-30

## 2020-12-18 NOTE — PHYSICAL EXAM
[Normal] : oriented to person, place and time, with appropriate affect [de-identified] : lap sites intact with steristrips in place. no drainage

## 2020-12-18 NOTE — HISTORY OF PRESENT ILLNESS
[FreeTextEntry1] : Ms. VAUGHN PLEITEZ is a 25 year old who presents for post-operative evaluation after a robotic splenic cyst fenestration on 12/7/20. she denies abdominal pain and is not taking pain medication.  Denies nausea, vomiting or diarrhea.  her lap sites are well approximated, c/d/i without drainage steristrips intact. Her preoperative pain is gone and she is doing well from a surgical perspective.  \par

## 2020-12-18 NOTE — ASSESSMENT
[FreeTextEntry1] : Ms. VAUGHN PLEITEZ is a 25 year old who presents for post-operative evaluation after a robotic splenic cyst fenestration on 12/7/20. final pathology still pending. We discussed her post-operative and recovery period and restrictions moving forward which include no lifting greater than 10 lbs for another few weeks. She was instructed that she can remove the steristrips when she next showers. We will follow up with her once pathology results and otherwise Ms. PLEITEZ can return to clinic as needed.\par \par

## 2020-12-18 NOTE — REASON FOR VISIT
[de-identified] : robotic splenic cyst fenestration [de-identified] : 12/7/20 [de-identified] : 2

## 2022-01-29 ENCOUNTER — TRANSCRIPTION ENCOUNTER (OUTPATIENT)
Age: 27
End: 2022-01-29

## 2024-03-12 ENCOUNTER — EMERGENCY (EMERGENCY)
Facility: HOSPITAL | Age: 29
LOS: 1 days | Discharge: HOME | End: 2024-03-12
Attending: EMERGENCY MEDICINE | Admitting: EMERGENCY MEDICINE
Payer: COMMERCIAL

## 2024-03-12 VITALS
RESPIRATION RATE: 15 BRPM | OXYGEN SATURATION: 98 % | HEART RATE: 82 BPM | WEIGHT: 115.08 LBS | DIASTOLIC BLOOD PRESSURE: 59 MMHG | HEIGHT: 66 IN | SYSTOLIC BLOOD PRESSURE: 103 MMHG | TEMPERATURE: 98 F

## 2024-03-12 VITALS
RESPIRATION RATE: 16 BRPM | OXYGEN SATURATION: 99 % | SYSTOLIC BLOOD PRESSURE: 103 MMHG | DIASTOLIC BLOOD PRESSURE: 63 MMHG | TEMPERATURE: 97 F | HEART RATE: 61 BPM

## 2024-03-12 DIAGNOSIS — R11.2 NAUSEA WITH VOMITING, UNSPECIFIED: ICD-10-CM

## 2024-03-12 DIAGNOSIS — Z98.890 OTHER SPECIFIED POSTPROCEDURAL STATES: Chronic | ICD-10-CM

## 2024-03-12 DIAGNOSIS — B89 UNSPECIFIED PARASITIC DISEASE: ICD-10-CM

## 2024-03-12 DIAGNOSIS — F90.9 ATTENTION-DEFICIT HYPERACTIVITY DISORDER, UNSPECIFIED TYPE: ICD-10-CM

## 2024-03-12 DIAGNOSIS — E87.6 HYPOKALEMIA: ICD-10-CM

## 2024-03-12 DIAGNOSIS — K29.70 GASTRITIS, UNSPECIFIED, WITHOUT BLEEDING: ICD-10-CM

## 2024-03-12 LAB
ALBUMIN SERPL ELPH-MCNC: 3.3 G/DL — LOW (ref 3.4–5)
ALP SERPL-CCNC: 36 U/L — LOW (ref 40–120)
ALT FLD-CCNC: 15 U/L — SIGNIFICANT CHANGE UP (ref 12–42)
ANION GAP SERPL CALC-SCNC: 9 MMOL/L — SIGNIFICANT CHANGE UP (ref 9–16)
APPEARANCE UR: CLEAR — SIGNIFICANT CHANGE UP
AST SERPL-CCNC: 15 U/L — SIGNIFICANT CHANGE UP (ref 15–37)
BASOPHILS # BLD AUTO: 0.02 K/UL — SIGNIFICANT CHANGE UP (ref 0–0.2)
BASOPHILS NFR BLD AUTO: 0.3 % — SIGNIFICANT CHANGE UP (ref 0–2)
BILIRUB SERPL-MCNC: 0.5 MG/DL — SIGNIFICANT CHANGE UP (ref 0.2–1.2)
BILIRUB UR-MCNC: NEGATIVE — SIGNIFICANT CHANGE UP
BUN SERPL-MCNC: 7 MG/DL — SIGNIFICANT CHANGE UP (ref 7–23)
CALCIUM SERPL-MCNC: 9 MG/DL — SIGNIFICANT CHANGE UP (ref 8.5–10.5)
CHLORIDE SERPL-SCNC: 101 MMOL/L — SIGNIFICANT CHANGE UP (ref 96–108)
CO2 SERPL-SCNC: 27 MMOL/L — SIGNIFICANT CHANGE UP (ref 22–31)
COLOR SPEC: YELLOW — SIGNIFICANT CHANGE UP
CREAT SERPL-MCNC: 0.57 MG/DL — SIGNIFICANT CHANGE UP (ref 0.5–1.3)
DIFF PNL FLD: NEGATIVE — SIGNIFICANT CHANGE UP
EGFR: 128 ML/MIN/1.73M2 — SIGNIFICANT CHANGE UP
EOSINOPHIL # BLD AUTO: 0 K/UL — SIGNIFICANT CHANGE UP (ref 0–0.5)
EOSINOPHIL NFR BLD AUTO: 0 % — SIGNIFICANT CHANGE UP (ref 0–6)
GLUCOSE SERPL-MCNC: 125 MG/DL — HIGH (ref 70–99)
GLUCOSE UR QL: NEGATIVE MG/DL — SIGNIFICANT CHANGE UP
HCG SERPL-ACNC: 1 MIU/ML — SIGNIFICANT CHANGE UP
HCT VFR BLD CALC: 37.4 % — SIGNIFICANT CHANGE UP (ref 34.5–45)
HGB BLD-MCNC: 12.5 G/DL — SIGNIFICANT CHANGE UP (ref 11.5–15.5)
IMM GRANULOCYTES NFR BLD AUTO: 0.3 % — SIGNIFICANT CHANGE UP (ref 0–0.9)
KETONES UR-MCNC: NEGATIVE MG/DL — SIGNIFICANT CHANGE UP
LEUKOCYTE ESTERASE UR-ACNC: NEGATIVE — SIGNIFICANT CHANGE UP
LIDOCAIN IGE QN: 24 U/L — SIGNIFICANT CHANGE UP (ref 16–77)
LYMPHOCYTES # BLD AUTO: 1.13 K/UL — SIGNIFICANT CHANGE UP (ref 1–3.3)
LYMPHOCYTES # BLD AUTO: 16.1 % — SIGNIFICANT CHANGE UP (ref 13–44)
MAGNESIUM SERPL-MCNC: 1.7 MG/DL — SIGNIFICANT CHANGE UP (ref 1.6–2.6)
MCHC RBC-ENTMCNC: 31.6 PG — SIGNIFICANT CHANGE UP (ref 27–34)
MCHC RBC-ENTMCNC: 33.4 GM/DL — SIGNIFICANT CHANGE UP (ref 32–36)
MCV RBC AUTO: 94.4 FL — SIGNIFICANT CHANGE UP (ref 80–100)
MONOCYTES # BLD AUTO: 0.37 K/UL — SIGNIFICANT CHANGE UP (ref 0–0.9)
MONOCYTES NFR BLD AUTO: 5.3 % — SIGNIFICANT CHANGE UP (ref 2–14)
NEUTROPHILS # BLD AUTO: 5.49 K/UL — SIGNIFICANT CHANGE UP (ref 1.8–7.4)
NEUTROPHILS NFR BLD AUTO: 78 % — HIGH (ref 43–77)
NITRITE UR-MCNC: NEGATIVE — SIGNIFICANT CHANGE UP
NRBC # BLD: 0 /100 WBCS — SIGNIFICANT CHANGE UP (ref 0–0)
PH UR: 8.5 (ref 5–8)
PHOSPHATE SERPL-MCNC: 3.1 MG/DL — SIGNIFICANT CHANGE UP (ref 2.5–4.5)
PLATELET # BLD AUTO: 198 K/UL — SIGNIFICANT CHANGE UP (ref 150–400)
POTASSIUM SERPL-MCNC: 3.4 MMOL/L — LOW (ref 3.5–5.3)
POTASSIUM SERPL-SCNC: 3.4 MMOL/L — LOW (ref 3.5–5.3)
PROT SERPL-MCNC: 6.6 G/DL — SIGNIFICANT CHANGE UP (ref 6.4–8.2)
PROT UR-MCNC: NEGATIVE MG/DL — SIGNIFICANT CHANGE UP
RBC # BLD: 3.96 M/UL — SIGNIFICANT CHANGE UP (ref 3.8–5.2)
RBC # FLD: 11.8 % — SIGNIFICANT CHANGE UP (ref 10.3–14.5)
SODIUM SERPL-SCNC: 137 MMOL/L — SIGNIFICANT CHANGE UP (ref 132–145)
SP GR SPEC: 1.01 — SIGNIFICANT CHANGE UP (ref 1–1.03)
UROBILINOGEN FLD QL: 0.2 MG/DL — SIGNIFICANT CHANGE UP (ref 0.2–1)
WBC # BLD: 7.03 K/UL — SIGNIFICANT CHANGE UP (ref 3.8–10.5)
WBC # FLD AUTO: 7.03 K/UL — SIGNIFICANT CHANGE UP (ref 3.8–10.5)

## 2024-03-12 PROCEDURE — 99284 EMERGENCY DEPT VISIT MOD MDM: CPT

## 2024-03-12 RX ORDER — ONDANSETRON 8 MG/1
4 TABLET, FILM COATED ORAL ONCE
Refills: 0 | Status: COMPLETED | OUTPATIENT
Start: 2024-03-12 | End: 2024-03-12

## 2024-03-12 RX ORDER — FAMOTIDINE 10 MG/ML
20 INJECTION INTRAVENOUS ONCE
Refills: 0 | Status: COMPLETED | OUTPATIENT
Start: 2024-03-12 | End: 2024-03-12

## 2024-03-12 RX ORDER — SODIUM CHLORIDE 9 MG/ML
1000 INJECTION INTRAMUSCULAR; INTRAVENOUS; SUBCUTANEOUS ONCE
Refills: 0 | Status: COMPLETED | OUTPATIENT
Start: 2024-03-12 | End: 2024-03-12

## 2024-03-12 RX ORDER — ALBENDAZOLE 200 MG/1
2 TABLET, FILM COATED ORAL
Qty: 6 | Refills: 0
Start: 2024-03-12 | End: 2024-03-14

## 2024-03-12 RX ORDER — ONDANSETRON 8 MG/1
1 TABLET, FILM COATED ORAL
Qty: 1 | Refills: 0
Start: 2024-03-12 | End: 2024-03-13

## 2024-03-12 RX ORDER — POTASSIUM CHLORIDE 20 MEQ
40 PACKET (EA) ORAL ONCE
Refills: 0 | Status: COMPLETED | OUTPATIENT
Start: 2024-03-12 | End: 2024-03-12

## 2024-03-12 RX ADMIN — SODIUM CHLORIDE 1000 MILLILITER(S): 9 INJECTION INTRAMUSCULAR; INTRAVENOUS; SUBCUTANEOUS at 14:15

## 2024-03-12 RX ADMIN — ONDANSETRON 4 MILLIGRAM(S): 8 TABLET, FILM COATED ORAL at 11:56

## 2024-03-12 RX ADMIN — Medication 40 MILLIEQUIVALENT(S): at 13:27

## 2024-03-12 RX ADMIN — SODIUM CHLORIDE 1000 MILLILITER(S): 9 INJECTION INTRAMUSCULAR; INTRAVENOUS; SUBCUTANEOUS at 11:55

## 2024-03-12 RX ADMIN — FAMOTIDINE 20 MILLIGRAM(S): 10 INJECTION INTRAVENOUS at 11:55

## 2024-03-12 RX ADMIN — Medication 30 MILLILITER(S): at 13:49

## 2024-03-12 NOTE — ED PROVIDER NOTE - PROGRESS NOTE DETAILS
Kassandra Baeza DO (PGY3): Patient symptoms improved after medication.  Labs showing mild hypokalemia, patient given potassium for repletion.  Patient tolerating p.o.  Other labs without emergent findings.  Plan for continued treat with with albendazole and GI follow-up. Pt made aware of lab results. Questions regarding their symptoms were addressed. Advised to follow up with gi. Given strict return precautions. Pt verbalized understanding.

## 2024-03-12 NOTE — ED PROVIDER NOTE - OBJECTIVE STATEMENT
28F PMH ADHD presenting to ED with epigastric abdominal pain associated with nausea and NBNB vomiting since last night, pt returned from [facility] 2 days ago, states about 5 days ago she experienced severe watery diarrhea and noted worms in stool, no blood, pt took one pill of mebendazole/quinfamide 5 days ago with improvement in symptoms about 2 days ago until last night. No associated fever, denies chest pain, sob, cough, sore throat.

## 2024-03-12 NOTE — ED PROVIDER NOTE - CLINICAL SUMMARY MEDICAL DECISION MAKING FREE TEXT BOX
28F PMH ADHD presenting to ED with epigastric abdominal pain associated with nausea and NBNB vomiting since last night, pt returned from [facility] 2 days ago, states about 5 days ago she experienced severe watery diarrhea and noted worms in stool, no blood, pt took one pill of mebendazole/quinfamide 5 days ago with improvement in symptoms about 2 days ago until last night. Given hx and physical, ddx includes but is not limited to gastritis, h pylori, parasitic infection, gastroenteritis, enteritis, metabolic derangement, pregnancy. Plan for labs, ivf, meds, reassess.

## 2024-03-12 NOTE — ED PROVIDER NOTE - PATIENT PORTAL LINK FT
You can access the FollowMyHealth Patient Portal offered by [facility] by registering at the following website: [email or web address]. By joining [employer]’s FollowMyHealth portal, you will also be able to view your health information using other applications (apps) compatible with our system.

## 2024-03-12 NOTE — ED ADULT NURSE NOTE - CAS EDN DISCHARGE ASSESSMENT
1. The severity of signs/symptoms.(See ED/admit documents) Alert and oriented to person, place and time

## 2024-03-12 NOTE — ED PROVIDER NOTE - PHYSICAL EXAMINATION
GENERAL: Awake. Alert. NAD. Well nourished.  HEENT: NC/AT, Conjunctiva pink, no scleral icterus. Airway patent. Moist mucous membranes.  LUNGS: CTAB. No wheezes or rales noted.  CARDIAC: RRR.  ABDOMEN: No masses noted. Soft, NT, ND, no rebound, no guarding.  EXT: No edema, no calf tenderness, distal pulses 2+ bilaterally  NEURO: A&Ox3. Moving all extremities. Sensation and strength intact throughout.   SKIN: Warm and dry.   PSYCH: Normal affect.

## 2024-03-12 NOTE — ED PROVIDER NOTE - ATTENDING CONTRIBUTION TO CARE
28-year-old female with history of ADHD, with generalized abdominal pain associated with nausea and vomiting x 1 night, patient returned from [facility] approximately 2 days ago, she had symptoms of watery diarrhea and noted worms in her stool, at that time no blood, she took 1 pill of mebendazole/clindamycin 5 days ago with improvement in her symptoms.  Until last night she had resolution of her symptoms but they recurred last night and she again saw some worms in her stool.  She describes them as white long, mobile in the stool which is solid.  On arrival in the ED with no significant abdominal tenderness, labs noted with mild hypokalemia, consistent with recent history of vomiting, IV fluids and antiemetics in the ED patient was tolerating p.o., states her abdominal pain resolved, advised second course of albendazole and outpatient GI follow-up.  We waited for stool sample but patient could not provide a stool sample, so we informed her she could return or follow-up with GI to have the stool sent after the second course of albendazole is completed.  She appears reliable and was given detailed and strict follow-up instructions, she will return for worsening symptoms.  Stable for DC home.

## 2024-03-12 NOTE — ED PROVIDER NOTE - NSFOLLOWUPINSTRUCTIONS_ED_ALL_ED_FT
Please follow-up with GI doctor, the hospital will call you to help set up an appointment.  Albendazole sent to your pharmacy, please take as prescribed until you see your GI doctor.  Return for worsening symptoms such as those listed below.    Gastritis    Gastritis is soreness and swelling (inflammation) of the lining of the stomach. Gastritis can develop as a sudden onset (acute) or long-term (chronic) condition. If gastritis is not treated, it can lead to stomach bleeding and ulcers. Causes include viral and bacterial infections, excessive alcohol consumption, tobacco use, or certain medications. Symptoms include nausea, vomiting, or abdominal pain or burning especially after eating. Avoid foods or drinks that make your symptoms worse such as caffeine, chocolate, spicy foods, acidic foods, or alcohol.    SEEK IMMEDIATE MEDICAL CARE IF YOU HAVE ANY OF THE FOLLOWING SYMPTOMS: black or bloody stools, blood or coffee-ground-colored vomitus, worsening abdominal pain, fever, or inability to keep fluids down.     Diarrhea    Diarrhea is frequent loose or watery bowel movements that has many causes. Diarrhea can make you feel weak and cause you to become dehydrated. Diarrhea typically lasts 2–3 days, but can last longer if it is a sign of something more serious. Drink clear fluids to prevent dehydration. Eat bland, easy-to-digest foods as tolerated.     SEEK IMMEDIATE MEDICAL CARE IF YOU HAVE ANY OF THE FOLLOWING SYMPTOMS: high fevers, lightheadedness/dizziness, chest pain, black or bloody stools, shortness of breath, severe abdominal or back pain, or any signs of dehydration.

## 2024-03-12 NOTE — ED ADULT NURSE NOTE - OBJECTIVE STATEMENT
aox3, breathing even and unlabored on RA c/o mid upper abd pain x2 days. was previously in [place] where she has similar symptoms and notice parasites in her stool. last BM was last night and pt states she noticed parasites in last BM as well. pt was taking antiparasitic she got OTC in [facility] which helped her symptoms for a few days but symptoms returned two days ago.

## 2024-03-13 ENCOUNTER — EMERGENCY (EMERGENCY)
Facility: HOSPITAL | Age: 29
LOS: 1 days | Discharge: HOME | End: 2024-03-13
Attending: STUDENT IN AN ORGANIZED HEALTH CARE EDUCATION/TRAINING PROGRAM | Admitting: EMERGENCY MEDICINE
Payer: COMMERCIAL

## 2024-03-13 VITALS
SYSTOLIC BLOOD PRESSURE: 109 MMHG | OXYGEN SATURATION: 100 % | RESPIRATION RATE: 19 BRPM | TEMPERATURE: 98 F | DIASTOLIC BLOOD PRESSURE: 73 MMHG | HEART RATE: 65 BPM

## 2024-03-13 VITALS
SYSTOLIC BLOOD PRESSURE: 100 MMHG | DIASTOLIC BLOOD PRESSURE: 69 MMHG | HEART RATE: 65 BPM | TEMPERATURE: 98 F | WEIGHT: 115.08 LBS | HEIGHT: 66 IN | RESPIRATION RATE: 16 BRPM | OXYGEN SATURATION: 98 %

## 2024-03-13 DIAGNOSIS — R11.0 NAUSEA: ICD-10-CM

## 2024-03-13 DIAGNOSIS — R19.7 DIARRHEA, UNSPECIFIED: ICD-10-CM

## 2024-03-13 DIAGNOSIS — R10.33 PERIUMBILICAL PAIN: ICD-10-CM

## 2024-03-13 DIAGNOSIS — Z90.81 ACQUIRED ABSENCE OF SPLEEN: ICD-10-CM

## 2024-03-13 DIAGNOSIS — Z98.890 OTHER SPECIFIED POSTPROCEDURAL STATES: Chronic | ICD-10-CM

## 2024-03-13 PROBLEM — Z78.9 OTHER SPECIFIED HEALTH STATUS: Chronic | Status: ACTIVE | Noted: 2024-03-12

## 2024-03-13 LAB
ALBUMIN SERPL ELPH-MCNC: 4.1 G/DL — SIGNIFICANT CHANGE UP (ref 3.4–5)
ALP SERPL-CCNC: 46 U/L — SIGNIFICANT CHANGE UP (ref 40–120)
ALT FLD-CCNC: 18 U/L — SIGNIFICANT CHANGE UP (ref 12–42)
ANION GAP SERPL CALC-SCNC: 7 MMOL/L — LOW (ref 9–16)
APPEARANCE UR: CLEAR — SIGNIFICANT CHANGE UP
APTT BLD: 34.9 SEC — SIGNIFICANT CHANGE UP (ref 24.5–35.6)
AST SERPL-CCNC: 16 U/L — SIGNIFICANT CHANGE UP (ref 15–37)
BILIRUB SERPL-MCNC: 0.3 MG/DL — SIGNIFICANT CHANGE UP (ref 0.2–1.2)
BILIRUB UR-MCNC: NEGATIVE — SIGNIFICANT CHANGE UP
BUN SERPL-MCNC: 3 MG/DL — LOW (ref 7–23)
CALCIUM SERPL-MCNC: 9.6 MG/DL — SIGNIFICANT CHANGE UP (ref 8.5–10.5)
CHLORIDE SERPL-SCNC: 105 MMOL/L — SIGNIFICANT CHANGE UP (ref 96–108)
CO2 SERPL-SCNC: 30 MMOL/L — SIGNIFICANT CHANGE UP (ref 22–31)
COLOR SPEC: YELLOW — SIGNIFICANT CHANGE UP
CREAT SERPL-MCNC: 0.65 MG/DL — SIGNIFICANT CHANGE UP (ref 0.5–1.3)
CULTURE RESULTS: NO GROWTH — SIGNIFICANT CHANGE UP
DIFF PNL FLD: NEGATIVE — SIGNIFICANT CHANGE UP
EGFR: 124 ML/MIN/1.73M2 — SIGNIFICANT CHANGE UP
GLUCOSE SERPL-MCNC: 98 MG/DL — SIGNIFICANT CHANGE UP (ref 70–99)
GLUCOSE UR QL: NEGATIVE MG/DL — SIGNIFICANT CHANGE UP
HCG SERPL-ACNC: <1 MIU/ML — SIGNIFICANT CHANGE UP
HCT VFR BLD CALC: 41.6 % — SIGNIFICANT CHANGE UP (ref 34.5–45)
HGB BLD-MCNC: 13.7 G/DL — SIGNIFICANT CHANGE UP (ref 11.5–15.5)
INR BLD: 1.01 — SIGNIFICANT CHANGE UP (ref 0.85–1.18)
KETONES UR-MCNC: NEGATIVE MG/DL — SIGNIFICANT CHANGE UP
LACTATE BLDV-MCNC: 0.9 MMOL/L — SIGNIFICANT CHANGE UP (ref 0.5–2)
LEUKOCYTE ESTERASE UR-ACNC: NEGATIVE — SIGNIFICANT CHANGE UP
LIDOCAIN IGE QN: 29 U/L — SIGNIFICANT CHANGE UP (ref 16–77)
MCHC RBC-ENTMCNC: 31.9 PG — SIGNIFICANT CHANGE UP (ref 27–34)
MCHC RBC-ENTMCNC: 32.9 GM/DL — SIGNIFICANT CHANGE UP (ref 32–36)
MCV RBC AUTO: 96.7 FL — SIGNIFICANT CHANGE UP (ref 80–100)
NITRITE UR-MCNC: NEGATIVE — SIGNIFICANT CHANGE UP
NRBC # BLD: 0 /100 WBCS — SIGNIFICANT CHANGE UP (ref 0–0)
PH UR: 7 — SIGNIFICANT CHANGE UP (ref 5–8)
PLATELET # BLD AUTO: 222 K/UL — SIGNIFICANT CHANGE UP (ref 150–400)
POTASSIUM SERPL-MCNC: 3.9 MMOL/L — SIGNIFICANT CHANGE UP (ref 3.5–5.3)
POTASSIUM SERPL-SCNC: 3.9 MMOL/L — SIGNIFICANT CHANGE UP (ref 3.5–5.3)
PROT SERPL-MCNC: 7.8 G/DL — SIGNIFICANT CHANGE UP (ref 6.4–8.2)
PROT UR-MCNC: NEGATIVE MG/DL — SIGNIFICANT CHANGE UP
PROTHROM AB SERPL-ACNC: 11.4 SEC — SIGNIFICANT CHANGE UP (ref 9.5–13)
RBC # BLD: 4.3 M/UL — SIGNIFICANT CHANGE UP (ref 3.8–5.2)
RBC # FLD: 12.2 % — SIGNIFICANT CHANGE UP (ref 10.3–14.5)
SODIUM SERPL-SCNC: 142 MMOL/L — SIGNIFICANT CHANGE UP (ref 132–145)
SP GR SPEC: 1.01 — SIGNIFICANT CHANGE UP (ref 1–1.03)
SPECIMEN SOURCE: SIGNIFICANT CHANGE UP
UROBILINOGEN FLD QL: 0.2 MG/DL — SIGNIFICANT CHANGE UP (ref 0.2–1)
WBC # BLD: 4.77 K/UL — SIGNIFICANT CHANGE UP (ref 3.8–10.5)
WBC # FLD AUTO: 4.77 K/UL — SIGNIFICANT CHANGE UP (ref 3.8–10.5)

## 2024-03-13 PROCEDURE — 99285 EMERGENCY DEPT VISIT HI MDM: CPT

## 2024-03-13 PROCEDURE — 74177 CT ABD & PELVIS W/CONTRAST: CPT | Mod: 26,MC

## 2024-03-13 PROCEDURE — 76705 ECHO EXAM OF ABDOMEN: CPT | Mod: 26

## 2024-03-13 RX ORDER — ONDANSETRON 8 MG/1
4 TABLET, FILM COATED ORAL ONCE
Refills: 0 | Status: COMPLETED | OUTPATIENT
Start: 2024-03-13 | End: 2024-03-13

## 2024-03-13 RX ORDER — MORPHINE SULFATE 50 MG/1
2 CAPSULE, EXTENDED RELEASE ORAL ONCE
Refills: 0 | Status: DISCONTINUED | OUTPATIENT
Start: 2024-03-13 | End: 2024-03-13

## 2024-03-13 RX ORDER — ACETAMINOPHEN 500 MG
975 TABLET ORAL ONCE
Refills: 0 | Status: COMPLETED | OUTPATIENT
Start: 2024-03-13 | End: 2024-03-13

## 2024-03-13 RX ORDER — KETOROLAC TROMETHAMINE 30 MG/ML
15 SYRINGE (ML) INJECTION ONCE
Refills: 0 | Status: DISCONTINUED | OUTPATIENT
Start: 2024-03-13 | End: 2024-03-13

## 2024-03-13 RX ORDER — IOHEXOL 300 MG/ML
30 INJECTION, SOLUTION INTRAVENOUS ONCE
Refills: 0 | Status: COMPLETED | OUTPATIENT
Start: 2024-03-13 | End: 2024-03-13

## 2024-03-13 RX ORDER — SODIUM CHLORIDE 9 MG/ML
1000 INJECTION INTRAMUSCULAR; INTRAVENOUS; SUBCUTANEOUS ONCE
Refills: 0 | Status: COMPLETED | OUTPATIENT
Start: 2024-03-13 | End: 2024-03-13

## 2024-03-13 RX ADMIN — IOHEXOL 30 MILLILITER(S): 300 INJECTION, SOLUTION INTRAVENOUS at 16:52

## 2024-03-13 RX ADMIN — ONDANSETRON 4 MILLIGRAM(S): 8 TABLET, FILM COATED ORAL at 21:27

## 2024-03-13 RX ADMIN — Medication 15 MILLIGRAM(S): at 17:27

## 2024-03-13 RX ADMIN — Medication 15 MILLIGRAM(S): at 16:51

## 2024-03-13 RX ADMIN — MORPHINE SULFATE 2 MILLIGRAM(S): 50 CAPSULE, EXTENDED RELEASE ORAL at 19:26

## 2024-03-13 RX ADMIN — Medication 975 MILLIGRAM(S): at 21:27

## 2024-03-13 RX ADMIN — SODIUM CHLORIDE 1000 MILLILITER(S): 9 INJECTION INTRAMUSCULAR; INTRAVENOUS; SUBCUTANEOUS at 18:27

## 2024-03-13 RX ADMIN — SODIUM CHLORIDE 1000 MILLILITER(S): 9 INJECTION INTRAMUSCULAR; INTRAVENOUS; SUBCUTANEOUS at 16:52

## 2024-03-13 RX ADMIN — MORPHINE SULFATE 2 MILLIGRAM(S): 50 CAPSULE, EXTENDED RELEASE ORAL at 17:27

## 2024-03-13 NOTE — ED POST DISCHARGE NOTE - OTHER COMMUNICATION
Patient called because abendazole not covered. Also said she is "not doing well." I directed her to come back to the ED for repeat evaluation and stool studies.

## 2024-03-13 NOTE — ED ADULT NURSE REASSESSMENT NOTE - NS ED NURSE REASSESS COMMENT FT1
Pt dc, all education given to patient and understood by patient. VSS and IV dc with tip intact. Pt ambulating out of ed with all belonigns and all paperwork. Care Complete

## 2024-03-13 NOTE — ED PROVIDER NOTE - PROGRESS NOTE DETAILS
Pt reports marked improvement after ondansetron. She has tolerated PO. She cannot produce a stool sample. She has follow up with GI.

## 2024-03-13 NOTE — ED PROVIDER NOTE - NSICDXPASTMEDICALHX_GEN_ALL_CORE_FT
PAST MEDICAL HISTORY:  ADD (attention deficit disorder)     Anxiety     Cyst of spleen     No pertinent past medical history

## 2024-03-13 NOTE — ED ADULT NURSE NOTE - NSFALLUNIVINTERV_ED_ALL_ED
Bed/Stretcher in lowest position, wheels locked, appropriate side rails in place/Call bell, personal items and telephone in reach/Instruct patient to call for assistance before getting out of bed/chair/stretcher/Non-slip footwear applied when patient is off stretcher/[place] to call system/Physically safe environment - no spills, clutter or unnecessary equipment/Purposeful proactive rounding/Room/bathroom lighting operational, light cord in reach

## 2024-03-13 NOTE — ED ADULT NURSE NOTE - OBJECTIVE STATEMENT
Pt presents to ed repeat from yesterday. Pt in ed for stool sample. Pt reports she could not get her meds which were prescribed last visit because insurance needs stool sample. Pt reports back pain and abd pain. Pt reports unable to produce stool, was able to produce urine. NKDA, reports taking lexapro, clinopin and vyvanse. Hx spleen surgery for cyst removal.

## 2024-03-13 NOTE — ED PROVIDER NOTE - NSFOLLOWUPINSTRUCTIONS_ED_ALL_ED_FT
Please take ondansetron (zofran) as needed for pain.    Please follow up with GI as soon as you are able. Bringing a stool sample to that appointment would be helpful. You can also return here to give a stool sample if necessary.    Please know that this evaluation is incomplete until you have followed up on today's findings with a primary care or specialist physician.    Thank you and feel better soon.

## 2024-03-13 NOTE — ED PROVIDER NOTE - CLINICAL SUMMARY MEDICAL DECISION MAKING FREE TEXT BOX
Abd pain and diarrhea, was in [place] approx 5 days ago and had worms in her stool, also had watery diarrhea.  Took an antiparasitic in [place] and felt improved then worsened upon return to [place].  Has history of partial splenectomy.  No fever or blood in stool.  Was in ED at [facility] yesterday.    CT abd pelvis, saline, pain medication ordered.  GI PCR panel, stool culture, ova and parasites ordered.

## 2024-03-13 NOTE — ED ADULT TRIAGE NOTE - TEMPERATURE IN FAHRENHEIT (DEGREES F)
----- Message from Nadja Castillo Rep sent at 2/19/2021  9:44 AM EST -----  Regarding: vaccine question  Contact: [phone]  Pt has question concerning vaccine and current medication. Please call pt. Thank you     98.2

## 2024-03-13 NOTE — ED PROVIDER NOTE - OBJECTIVE STATEMENT
Abd pain and diarrhea, was in [place] approx 5 days ago and had worms in her stool, also had watery diarrhea.  Took an antiparasitic in [place] and felt improved then worsened upon return to [place].  Has history of partial splenectomy.  No fever or blood in stool.  Was in ED at [facility] yesterday. [id number]

## 2024-03-13 NOTE — ED ADULT NURSE REASSESSMENT NOTE - NS ED NURSE REASSESS COMMENT FT1
Pt reporting 9/10 pain. MD aware. VSS prior to medication admin and pt medicated as ordered. Pt connected to moniotr, HR noted to be 51 and regular. Pt in bed, reproting pain improved. ECG beign done. Pt denies any dizzy, cp, n/v, sob. Plan of care ongoing

## 2024-03-13 NOTE — ED ADULT TRIAGE NOTE - CHIEF COMPLAINT QUOTE
Pt states she was seen here yesterday and directed to come back to provide a stool sample. Has nonbloody diarrhea.

## 2024-03-13 NOTE — ED PROVIDER NOTE - PATIENT PORTAL LINK FT
You can access the FollowMyHealth Patient Portal offered by [facility] by registering at the following website: [email or web address]. By joining [employer]’s FollowMyHealth portal, you will also be able to view your health information using other applications (apps) compatible with our system.

## 2024-03-13 NOTE — ED ADULT NURSE REASSESSMENT NOTE - NS ED NURSE REASSESS COMMENT FT1
20glac initiated and labs sent. Pt medicated as ordered. All medication education given to patient and pt understands. Plan of care ongoing

## 2024-03-13 NOTE — ED ADULT NURSE REASSESSMENT NOTE - NS ED NURSE REASSESS COMMENT FT1
MD requested that  pt get some ice chips/saltines to stimulate bowel movement. Pt provided with items. Pt reported her pain is manageable and did not want any further pain management at this time. Plan of care ongoing

## 2024-03-13 NOTE — ED ADULT NURSE REASSESSMENT NOTE - NS ED NURSE REASSESS COMMENT FT1
revcieved handoff report from Kenia MUNROE for continuity of care. Pt in bed, reproting pain of 5/10. Pt reports she cannot give stool sample at this time. Pt reconnected to monitor, plan of care ongoing

## 2024-03-14 ENCOUNTER — INPATIENT (INPATIENT)
Facility: HOSPITAL | Age: 29
LOS: 1 days | Discharge: HOME | DRG: 373 | End: 2024-03-16
Attending: INTERNAL MEDICINE | Admitting: STUDENT IN AN ORGANIZED HEALTH CARE EDUCATION/TRAINING PROGRAM
Payer: COMMERCIAL

## 2024-03-14 VITALS
WEIGHT: 115.08 LBS | TEMPERATURE: 98 F | HEIGHT: 66 IN | RESPIRATION RATE: 16 BRPM | OXYGEN SATURATION: 100 % | HEART RATE: 68 BPM | DIASTOLIC BLOOD PRESSURE: 79 MMHG | SYSTOLIC BLOOD PRESSURE: 123 MMHG

## 2024-03-14 DIAGNOSIS — Z98.890 OTHER SPECIFIED POSTPROCEDURAL STATES: Chronic | ICD-10-CM

## 2024-03-14 LAB
ALBUMIN SERPL ELPH-MCNC: 4.3 G/DL — SIGNIFICANT CHANGE UP (ref 3.3–5)
ALP SERPL-CCNC: 51 U/L — SIGNIFICANT CHANGE UP (ref 40–120)
ALT FLD-CCNC: 43 U/L — SIGNIFICANT CHANGE UP (ref 10–45)
ANION GAP SERPL CALC-SCNC: 9 MMOL/L — SIGNIFICANT CHANGE UP (ref 5–17)
AST SERPL-CCNC: 32 U/L — SIGNIFICANT CHANGE UP (ref 10–40)
BASOPHILS # BLD AUTO: 0.03 K/UL — SIGNIFICANT CHANGE UP (ref 0–0.2)
BASOPHILS NFR BLD AUTO: 0.4 % — SIGNIFICANT CHANGE UP (ref 0–2)
BILIRUB SERPL-MCNC: 0.3 MG/DL — SIGNIFICANT CHANGE UP (ref 0.2–1.2)
BUN SERPL-MCNC: 5 MG/DL — LOW (ref 7–23)
CALCIUM SERPL-MCNC: 10.1 MG/DL — SIGNIFICANT CHANGE UP (ref 8.4–10.5)
CHLORIDE SERPL-SCNC: 103 MMOL/L — SIGNIFICANT CHANGE UP (ref 96–108)
CO2 SERPL-SCNC: 28 MMOL/L — SIGNIFICANT CHANGE UP (ref 22–31)
CREAT SERPL-MCNC: 0.64 MG/DL — SIGNIFICANT CHANGE UP (ref 0.5–1.3)
EC EAEA GENE STL QL NAA+PROBE: DETECTED
EGFR: 124 ML/MIN/1.73M2 — SIGNIFICANT CHANGE UP
EOSINOPHIL # BLD AUTO: 0.05 K/UL — SIGNIFICANT CHANGE UP (ref 0–0.5)
EOSINOPHIL NFR BLD AUTO: 0.7 % — SIGNIFICANT CHANGE UP (ref 0–6)
EPEC DNA STL QL NAA+PROBE: DETECTED
GI PCR PANEL: DETECTED
GLUCOSE SERPL-MCNC: 104 MG/DL — HIGH (ref 70–99)
HAV IGM SER-ACNC: SIGNIFICANT CHANGE UP
HBV CORE IGM SER-ACNC: SIGNIFICANT CHANGE UP
HBV SURFACE AG SER-ACNC: SIGNIFICANT CHANGE UP
HCG SERPL-ACNC: <1 MIU/ML — SIGNIFICANT CHANGE UP
HCT VFR BLD CALC: 37.8 % — SIGNIFICANT CHANGE UP (ref 34.5–45)
HCV AB S/CO SERPL IA: 0.04 S/CO — SIGNIFICANT CHANGE UP
HCV AB SERPL-IMP: SIGNIFICANT CHANGE UP
HGB BLD-MCNC: 12.8 G/DL — SIGNIFICANT CHANGE UP (ref 11.5–15.5)
IMM GRANULOCYTES NFR BLD AUTO: 0.4 % — SIGNIFICANT CHANGE UP (ref 0–0.9)
LACTATE SERPL-SCNC: 1.1 MMOL/L — SIGNIFICANT CHANGE UP (ref 0.5–2)
LIDOCAIN IGE QN: 21 U/L — SIGNIFICANT CHANGE UP (ref 7–60)
LYMPHOCYTES # BLD AUTO: 1.31 K/UL — SIGNIFICANT CHANGE UP (ref 1–3.3)
LYMPHOCYTES # BLD AUTO: 18.3 % — SIGNIFICANT CHANGE UP (ref 13–44)
MCHC RBC-ENTMCNC: 32.8 PG — SIGNIFICANT CHANGE UP (ref 27–34)
MCHC RBC-ENTMCNC: 33.9 GM/DL — SIGNIFICANT CHANGE UP (ref 32–36)
MCV RBC AUTO: 96.9 FL — SIGNIFICANT CHANGE UP (ref 80–100)
MONOCYTES # BLD AUTO: 0.47 K/UL — SIGNIFICANT CHANGE UP (ref 0–0.9)
MONOCYTES NFR BLD AUTO: 6.6 % — SIGNIFICANT CHANGE UP (ref 2–14)
NEUTROPHILS # BLD AUTO: 5.27 K/UL — SIGNIFICANT CHANGE UP (ref 1.8–7.4)
NEUTROPHILS NFR BLD AUTO: 73.6 % — SIGNIFICANT CHANGE UP (ref 43–77)
NRBC # BLD: 0 /100 WBCS — SIGNIFICANT CHANGE UP (ref 0–0)
PLATELET # BLD AUTO: 179 K/UL — SIGNIFICANT CHANGE UP (ref 150–400)
POTASSIUM SERPL-MCNC: 3.9 MMOL/L — SIGNIFICANT CHANGE UP (ref 3.5–5.3)
POTASSIUM SERPL-SCNC: 3.9 MMOL/L — SIGNIFICANT CHANGE UP (ref 3.5–5.3)
PROT SERPL-MCNC: 6.5 G/DL — SIGNIFICANT CHANGE UP (ref 6–8.3)
RBC # BLD: 3.9 M/UL — SIGNIFICANT CHANGE UP (ref 3.8–5.2)
RBC # FLD: 11.9 % — SIGNIFICANT CHANGE UP (ref 10.3–14.5)
SODIUM SERPL-SCNC: 140 MMOL/L — SIGNIFICANT CHANGE UP (ref 135–145)
WBC # BLD: 7.16 K/UL — SIGNIFICANT CHANGE UP (ref 3.8–10.5)
WBC # FLD AUTO: 7.16 K/UL — SIGNIFICANT CHANGE UP (ref 3.8–10.5)

## 2024-03-14 PROCEDURE — 99285 EMERGENCY DEPT VISIT HI MDM: CPT

## 2024-03-14 PROCEDURE — 99223 1ST HOSP IP/OBS HIGH 75: CPT | Mod: GC

## 2024-03-14 PROCEDURE — 76705 ECHO EXAM OF ABDOMEN: CPT | Mod: 26

## 2024-03-14 RX ORDER — MORPHINE SULFATE 50 MG/1
2 CAPSULE, EXTENDED RELEASE ORAL EVERY 4 HOURS
Refills: 0 | Status: DISCONTINUED | OUTPATIENT
Start: 2024-03-14 | End: 2024-03-15

## 2024-03-14 RX ORDER — PANTOPRAZOLE SODIUM 20 MG/1
40 TABLET, DELAYED RELEASE ORAL ONCE
Refills: 0 | Status: COMPLETED | OUTPATIENT
Start: 2024-03-14 | End: 2024-03-14

## 2024-03-14 RX ORDER — ACETAMINOPHEN 500 MG
1000 TABLET ORAL ONCE
Refills: 0 | Status: COMPLETED | OUTPATIENT
Start: 2024-03-14 | End: 2024-03-14

## 2024-03-14 RX ORDER — ESCITALOPRAM OXALATE 10 MG/1
2 TABLET, FILM COATED ORAL
Refills: 0 | DISCHARGE

## 2024-03-14 RX ORDER — IBUPROFEN 200 MG
2 TABLET ORAL
Qty: 0 | Refills: 0 | DISCHARGE

## 2024-03-14 RX ORDER — CLONAZEPAM 1 MG
0.25 TABLET ORAL DAILY
Refills: 0 | Status: DISCONTINUED | OUTPATIENT
Start: 2024-03-14 | End: 2024-03-16

## 2024-03-14 RX ORDER — LISDEXAMFETAMINE DIMESYLATE 70 MG/1
1 CAPSULE ORAL
Refills: 0 | DISCHARGE

## 2024-03-14 RX ORDER — MORPHINE SULFATE 50 MG/1
4 CAPSULE, EXTENDED RELEASE ORAL ONCE
Refills: 0 | Status: DISCONTINUED | OUTPATIENT
Start: 2024-03-14 | End: 2024-03-14

## 2024-03-14 RX ORDER — SODIUM CHLORIDE 9 MG/ML
1000 INJECTION INTRAMUSCULAR; INTRAVENOUS; SUBCUTANEOUS ONCE
Refills: 0 | Status: COMPLETED | OUTPATIENT
Start: 2024-03-14 | End: 2024-03-14

## 2024-03-14 RX ORDER — ACETAMINOPHEN WITH CODEINE 300MG-30MG
1 TABLET ORAL
Qty: 0 | Refills: 0 | DISCHARGE

## 2024-03-14 RX ORDER — FAMOTIDINE 10 MG/ML
20 INJECTION INTRAVENOUS ONCE
Refills: 0 | Status: COMPLETED | OUTPATIENT
Start: 2024-03-14 | End: 2024-03-14

## 2024-03-14 RX ORDER — KETOROLAC TROMETHAMINE 30 MG/ML
15 SYRINGE (ML) INJECTION ONCE
Refills: 0 | Status: DISCONTINUED | OUTPATIENT
Start: 2024-03-14 | End: 2024-03-14

## 2024-03-14 RX ORDER — ONDANSETRON 8 MG/1
4 TABLET, FILM COATED ORAL EVERY 8 HOURS
Refills: 0 | Status: DISCONTINUED | OUTPATIENT
Start: 2024-03-14 | End: 2024-03-16

## 2024-03-14 RX ORDER — DEXTROAMPHETAMINE SACCHARATE, AMPHETAMINE ASPARTATE, DEXTROAMPHETAMINE SULFATE AND AMPHETAMINE SULFATE 1.875; 1.875; 1.875; 1.875 MG/1; MG/1; MG/1; MG/1
1 TABLET ORAL
Qty: 0 | Refills: 0 | DISCHARGE

## 2024-03-14 RX ORDER — ESCITALOPRAM OXALATE 10 MG/1
5 TABLET, FILM COATED ORAL DAILY
Refills: 0 | Status: DISCONTINUED | OUTPATIENT
Start: 2024-03-14 | End: 2024-03-16

## 2024-03-14 RX ORDER — CLONAZEPAM 1 MG
0.5 TABLET ORAL
Refills: 0 | DISCHARGE

## 2024-03-14 RX ORDER — OXYCODONE HYDROCHLORIDE 5 MG/1
5 TABLET ORAL EVERY 6 HOURS
Refills: 0 | Status: DISCONTINUED | OUTPATIENT
Start: 2024-03-14 | End: 2024-03-15

## 2024-03-14 RX ORDER — ACETAMINOPHEN 500 MG
650 TABLET ORAL EVERY 6 HOURS
Refills: 0 | Status: DISCONTINUED | OUTPATIENT
Start: 2024-03-14 | End: 2024-03-15

## 2024-03-14 RX ORDER — INFLUENZA VIRUS VACCINE 15; 15; 15; 15 UG/.5ML; UG/.5ML; UG/.5ML; UG/.5ML
0.5 SUSPENSION INTRAMUSCULAR ONCE
Refills: 0 | Status: DISCONTINUED | OUTPATIENT
Start: 2024-03-14 | End: 2024-03-16

## 2024-03-14 RX ADMIN — MORPHINE SULFATE 4 MILLIGRAM(S): 50 CAPSULE, EXTENDED RELEASE ORAL at 19:22

## 2024-03-14 RX ADMIN — Medication 400 MILLIGRAM(S): at 20:23

## 2024-03-14 RX ADMIN — FAMOTIDINE 20 MILLIGRAM(S): 10 INJECTION INTRAVENOUS at 19:19

## 2024-03-14 RX ADMIN — SODIUM CHLORIDE 1000 MILLILITER(S): 9 INJECTION INTRAMUSCULAR; INTRAVENOUS; SUBCUTANEOUS at 19:19

## 2024-03-14 RX ADMIN — MORPHINE SULFATE 4 MILLIGRAM(S): 50 CAPSULE, EXTENDED RELEASE ORAL at 19:52

## 2024-03-14 RX ADMIN — PANTOPRAZOLE SODIUM 40 MILLIGRAM(S): 20 TABLET, DELAYED RELEASE ORAL at 19:19

## 2024-03-14 RX ADMIN — Medication 1000 MILLIGRAM(S): at 20:53

## 2024-03-14 RX ADMIN — Medication 15 MILLIGRAM(S): at 20:53

## 2024-03-14 RX ADMIN — MORPHINE SULFATE 4 MILLIGRAM(S): 50 CAPSULE, EXTENDED RELEASE ORAL at 21:46

## 2024-03-14 RX ADMIN — Medication 15 MILLIGRAM(S): at 20:23

## 2024-03-14 NOTE — ED PROVIDER NOTE - CLINICAL SUMMARY MEDICAL DECISION MAKING FREE TEXT BOX
28 year old female with history of ADHD, s/p partial splenectomy, recent ED visits x 2 @ [facility] ED including yesterday, presenting with severe abdominal pain. 28 year old female with history of ADHD, s/p partial splenectomy, recent ED visits x 2 @ [facility] ED including yesterday, presenting with severe abdominal pain. Vitals wnl on arrival. Patient on my exam is in moderate/severe distress 2/2 to pain with diffuse tenderness throughout abdomen. CT/US results reviewed from yesterday--some pericholecystic fluid, periportal edema, heterogeneity of liver. Feel that repeat CT imaging is not warranted at this time given hemodynamically normal/stable and symptoms are unchanged when considering young age/radiation risk. Will rpt RU sono to assess for any change since yesterday. Anticipate will need admission for pain control, IV hydration, and GI team eval. Stool studies sent with sample brought from home.

## 2024-03-14 NOTE — H&P ADULT - NSHPSOCIALHISTORY_GEN_ALL_CORE
Drinks 2 alcoholic beverages a night on weeknights and 5 a day on weekends. Patient utilizes marijuana daily.

## 2024-03-14 NOTE — H&P ADULT - NSHPPHYSICALEXAM_GEN_ALL_CORE
.  VITAL SIGNS:  T(C): 36.8 (03-14-24 @ 22:45), Max: 36.8 (03-14-24 @ 22:45)  T(F): 98.2 (03-14-24 @ 22:45), Max: 98.2 (03-14-24 @ 22:45)  HR: 56 (03-14-24 @ 22:45) (56 - 68)  BP: 106/63 (03-14-24 @ 22:45) (102/56 - 123/79)  BP(mean): --  RR: 17 (03-14-24 @ 22:45) (16 - 17)  SpO2: 100% (03-14-24 @ 22:45) (100% - 100%)  Wt(kg): --    PHYSICAL EXAM:    Constitutional: WDWN resting comfortably in bed; NAD  Head: NC/AT  Eyes: PERRL, EOMI, anicteric sclera  ENT: no nasal discharge; uvula midline, no oropharyngeal erythema or exudates; MMM  Neck: supple; no JVD or thyromegaly  Respiratory: CTA B/L; no W/R/R, no retractions  Cardiac: +S1/S2; RRR; no M/R/G; PMI non-displaced  Gastrointestinal: abdomen soft, tender to palpation in epigastrium, celeste's sign positive, ND; no rebound or guarding; +BSx4  Back: spine midline, no bony tenderness or step-offs; no CVAT B/L  Extremities: WWP, no clubbing or cyanosis; no peripheral edema  Musculoskeletal: NROM x4; no joint swelling, tenderness or erythema  Vascular: 2+ radial, femoral, DP/PT pulses B/L  Dermatologic: skin warm, dry and intact; no rashes, wounds, or scars  Lymphatic: no submandibular or cervical LAD  Neurologic: AAOx3; CNII-XII grossly intact; no focal deficits  Psychiatric: affect and characteristics of appearance, verbalizations, behaviors are appropriate

## 2024-03-14 NOTE — H&P ADULT - PROBLEM SELECTOR PLAN 2
mildly utrending AST/ALT over course of last 3 days (multiple ED visits. Pabon sign positive on exam, RUQ US with Mild pericholecystic fluid without gallstones or gallbladder wall thickening. Mild perihepatic ascites This are nonspecific findings but may be secondary to volume overload. Mild hepatomegaly. Currently low suspicion for acute appendicitis and pericholecystic fluid related to viral GI infection.   - c/t monitor for symptoms

## 2024-03-14 NOTE — ED ADULT NURSE NOTE - CHIEF COMPLAINT QUOTE
Pt presents to the ED for "epigastric pain since Monday after returning from [facility]. Pt was seen yesterday at [facility] and found to have gallbladder thickening."

## 2024-03-14 NOTE — ED ADULT NURSE NOTE - OBJECTIVE STATEMENT
Pt A&ox4 and able to speak in complete sentences. Pt breathing even and unlabored with equal chest rise and fall. Pt arrived d/t epigastric pain and diarrehea. Pt denies cp, lightheadedness, sob, fevers.

## 2024-03-14 NOTE — H&P ADULT - NSHPLABSRESULTS_GEN_ALL_CORE
12.8   7.16  )-----------( 179      ( 14 Mar 2024 19:21 )             37.8       03-14    140  |  103  |  5<L>  ----------------------------<  104<H>  3.9   |  28  |  0.64    Ca    10.1      14 Mar 2024 19:21    TPro  6.5  /  Alb  4.3  /  TBili  0.3  /  DBili  x   /  AST  32  /  ALT  43  /  AlkPhos  51  03-14              Urinalysis Basic - ( 14 Mar 2024 19:21 )    Color: x / Appearance: x / SG: x / pH: x  Gluc: 104 mg/dL / Ketone: x  / Bili: x / Urobili: x   Blood: x / Protein: x / Nitrite: x   Leuk Esterase: x / RBC: x / WBC x   Sq Epi: x / Non Sq Epi: x / Bacteria: x        PT/INR - ( 13 Mar 2024 16:49 )   PT: 11.4 sec;   INR: 1.01          PTT - ( 13 Mar 2024 16:49 )  PTT:34.9 sec    Lactate Trend  03-14 @ 19:21 Lactate:1.1             CAPILLARY BLOOD GLUCOSE            Culture Results:   No growth (03-12 @ 11:24) 0

## 2024-03-14 NOTE — H&P ADULT - ATTENDING COMMENTS
#Abdominal pain: p/w 7 days of generalized abd pain, NV and diarrhea. CTAP/Abd US x2 w/ mild GB thickening otherwise no signs of acute trent. Hep panel negative. LFTs wnl. Recent stool studies + EPEC/EAEC. Symptoms possibly 2/2 infectious diarrhea, vs gastritis vs  ?CHS given persistent NV. c/w azithro given persistent diarrhea, start ppi/Carafate, monitor symptoms, serial exams.

## 2024-03-14 NOTE — ED PROVIDER NOTE - PHYSICAL EXAMINATION
Gen - Nontoxic but in moderate distress 2/2 pain; A+Ox3  HEENT - NCAT, EOMI, moist mucous membrane  Neck - supple  Resp - CTAB, no increased WOB  CV -  RRR, no m/r/g  Abd - soft, tender diffusely with voluntary guarding, no rebound  MSK - FROM of b/l UE and LE, no gross deformities  Extrem - no LE edema/erythema/tenderness  Neuro - no focal motor or sensation deficits  Skin - warm, well perfused

## 2024-03-14 NOTE — PATIENT PROFILE ADULT - FALL HARM RISK - UNIVERSAL INTERVENTIONS
Bed in lowest position, wheels locked, appropriate side rails in place/Call bell, personal items and telephone in reach/Instruct patient to call for assistance before getting out of bed or chair/Non-slip footwear when patient is out of bed/[place] to call system/Physically safe environment - no spills, clutter or unnecessary equipment/Purposeful Proactive Rounding/Room/bathroom lighting operational, light cord in reach

## 2024-03-14 NOTE — ED ADULT TRIAGE NOTE - CHIEF COMPLAINT QUOTE
Pt presents to the ED for "epigastric pain since Monday after returning from [facility]. Pt was seen yesterday at [facility] and found to have gallbladder thickening."

## 2024-03-14 NOTE — H&P ADULT - PROBLEM SELECTOR PLAN 1
7 days in duration with questionable worms in stool s/p [place] trip and -bendazole treatment. patient denies blood in stool hgb stable, GI PCR with EPEC and EAEC. CTAP w/o bowel pathology noted. Given hx of NBNB vomiting and daily use of marijuana. canabis related emesis unlikely given emesis has subsided. physical exam also notable for radiation to lower quadrants and CVA tenderness (AU negative) additional imaging to r/o ovarian torsion to be performed, nephrolithiasis unlikely given Kidneys and  were WNL on CTAP 3/13.  - start bactrim   - pain management   - consider GI consult in AM  - zofran PRN   - f/u stool O&P  - f/u Utox  - f/u pelvic US to r/o ovarian torsion 7 days in duration with questionable worms in stool s/p [place] trip and -bendazole treatment. patient denies blood in stool hgb stable, GI PCR with EPEC and EAEC. CTAP w/o bowel pathology noted. Given hx of NBNB vomiting and daily use of marijuana. canabis related emesis unlikely given emesis has subsided. physical exam also notable for radiation to lower quadrants and CVA tenderness (AU negative) additional imaging to r/o ovarian torsion to be performed, nephrolithiasis unlikely given Kidneys and  were WNL on CTAP 3/13.  - start azithromycin 500 q24h x3d  - pain management   - consider GI consult in AM  - zofran PRN   - f/u stool O&P  - f/u Utox  - f/u pelvic US to r/o ovarian torsion 7 days in duration with questionable worms in stool s/p [place] trip and -bendazole treatment. patient denies blood in stool hgb stable, GI PCR with EPEC and EAEC. CTAP w/o bowel pathology noted. Given hx of NBNB vomiting and daily use of marijuana. canabis related emesis unlikely given emesis has subsided. physical exam also notable for radiation to lower quadrants and CVA tenderness (AU negative) additional imaging to r/o ovarian torsion to be performed, nephrolithiasis unlikely given Kidneys and  were WNL on CTAP 3/13.  - start azithromycin 500 q24h x3d  - start ppi, Carafate   - pain management   - consider GI consult in AM  - zofran PRN   - f/u stool O&P  - f/u Utox  - f/u pelvic US to r/o ovarian torsion

## 2024-03-14 NOTE — H&P ADULT - PROBLEM SELECTOR PLAN 3
Drinks 2 alcoholic beverages a night on weeknights and 5 a day on weekends. No hx of withdrawal, intubations, ICU admissions.   - [facility] protocol

## 2024-03-14 NOTE — ED PROVIDER NOTE - OBJECTIVE STATEMENT
28 year old female with history of ADHD, s/p partial splenectomy, recent ED visits x 2 @ [facility] ED including yesterday, presenting with severe abdominal pain. States she recently returned from [facility], possibly had ?parasitic infection, has been taking anti-parasite medicine she purchased in [place], has had diffuse pain but worst in lower abdomen, seen @ [facility] in past 2 days and told she has thickening of her gallbladder wall. 28 year old female with history of ADHD, s/p partial splenectomy, recent ED visits x 2 @ [facility] ED including yesterday, presenting with severe abdominal pain. States she recently returned from [facility], possibly had ?parasitic infection (saw "worms" in her stool), has been taking anti-parasite medicine she purchased in [place] (?mebendazole), has had diffuse pain but worst in epigastric region/lower abdomen, [facility] in past [facility] days and told she has thickening of her gallbladder wall/?liver problem, dc'd after pain improved with morphine for f/u with GI but has been unable to and pain again severe.

## 2024-03-14 NOTE — H&P ADULT - ASSESSMENT
28F with history of ADHD, s/p partial splenectomy 2020, recent ED visits x 2 @ [facility] ED including yesterday, presenting with severe abdominal pain with multiple recent ED visits fth EPEC and EAEC.

## 2024-03-14 NOTE — H&P ADULT - HISTORY OF PRESENT ILLNESS
28F with history of ADHD, s/p partial splenectomy 2020, recent ED visits x 2 @ [facility] ED including yesterday, presenting with severe abdominal pain. Patient states to have recently returned from [place], with questionable parasitiv infection. Pateint reportedly saw worms? in stool, has been taking anti-parasite medicine (mebendazole/quinfamide) purchased in [facility], has had diffuse pain but worst in epigastric region/lower abdomen, seen @ [facility] twice in past 2 days. Diarrhea apparently started 7 days ago (when patient observed worms in stool). On eval at [facility] on 3/12, epigastric abdominal pain associated with nausea and NBNB vomiting one day in duration. Patient returned from [facility] on 3/10. Patient reports to have felt better after taking antiparasitic in [facility] then worsened upon return to [place]. On previous ED visitis patient underwent imaging with CTAP on 3/13 showing hepatomegaly c/f hepatitis and RUQ US displaying mild pericholecystic fluid without gallstones or gallbladder wall thickening. Patient was discharged after pain control with opioids and with plans to be evaluated by GI. On presentation today, mother at bedside brought stool sample and subsequently tested positive via GI PCR for EPEC and EAEC. A/t RMF for further management.     12/2020: severe left upper quadrant pain.  She had multiple imaging studies, which revealed an approximately 6cm cyst in the superior and posterior pole of the spleen that had findings consistent with hemorrhage in the cyst.  Given that she was severely symptomatic, we elected to do a cyst fenestration.    ED COURSE:   Vitals: T 97.5F, HR 68, /79, RR 16, SpO2 100%     Significant Labs: WCB 7.16, Hgb 12.8, plt 179, eosinophils 50, Na 140, K 3.9, Cr 0.64, glu 104, Ca 10.1, alb 4.3, AST/ALT/ALP 32/43/51, lipase 21, HCG <1, lactate 1.1UA negative on 3/12 & 3/13, hepatitis panel negative (no HBsAg negative as well), GI PCR with EAEC and EPEC positive    EKG: not performed    US Abd 3/14: Borderline gallbladder wall thickening without pericholecystic fluid or gallbladder stones, a nonspecific finding. Trace perihepatic ascites. Mild hepatomegaly.    CTAP with PO contrast 3/13: Mild gallbladder wall thickening and pericholecystic fluid. Recommend additional evaluation with right upper quadrant ultrasound. Nonspecific mild heterogeneity of the liver with periportal edema and please correlate clinically for hepatitis    RUQ US 3/13: Mild pericholecystic fluid without gallstones or gallbladder wall thickening. Mild perihepatic ascites This are nonspecific findings but may be secondary to volume overload. Mild hepatomegaly    Interventions: tylenol 1g IVPB, famotidine 20mg IVP, ketorolac 15 IVP, morphine 4mg x2, pantoprazole 40mg IVP, 1L NS

## 2024-03-14 NOTE — H&P ADULT - PROBLEM SELECTOR PLAN 6
F: s/p 1L NS   E: replete PRN   N: regular (patient choses to avoid PO at this time)  GI ppx: none indicated   DVT ppx: IMPROVE 0
Dennise Dexter(Attending)

## 2024-03-15 ENCOUNTER — TRANSCRIPTION ENCOUNTER (OUTPATIENT)
Age: 29
End: 2024-03-15

## 2024-03-15 ENCOUNTER — APPOINTMENT (OUTPATIENT)
Dept: INTERNAL MEDICINE | Facility: CLINIC | Age: 29
End: 2024-03-15

## 2024-03-15 DIAGNOSIS — F98.8 OTHER SPECIFIED BEHAVIORAL AND EMOTIONAL DISORDERS WITH ONSET USUALLY OCCURRING IN CHILDHOOD AND ADOLESCENCE: ICD-10-CM

## 2024-03-15 DIAGNOSIS — Z29.9 ENCOUNTER FOR PROPHYLACTIC MEASURES, UNSPECIFIED: ICD-10-CM

## 2024-03-15 DIAGNOSIS — K82.8 OTHER SPECIFIED DISEASES OF GALLBLADDER: ICD-10-CM

## 2024-03-15 DIAGNOSIS — F41.9 ANXIETY DISORDER, UNSPECIFIED: ICD-10-CM

## 2024-03-15 DIAGNOSIS — R10.9 UNSPECIFIED ABDOMINAL PAIN: ICD-10-CM

## 2024-03-15 DIAGNOSIS — Z87.898 PERSONAL HISTORY OF OTHER SPECIFIED CONDITIONS: ICD-10-CM

## 2024-03-15 LAB
ALBUMIN SERPL ELPH-MCNC: 3.8 G/DL — SIGNIFICANT CHANGE UP (ref 3.3–5)
ALP SERPL-CCNC: 43 U/L — SIGNIFICANT CHANGE UP (ref 40–120)
ALT FLD-CCNC: 27 U/L — SIGNIFICANT CHANGE UP (ref 10–45)
AMPHET UR-MCNC: NEGATIVE — SIGNIFICANT CHANGE UP
APPEARANCE UR: CLEAR — SIGNIFICANT CHANGE UP
AST SERPL-CCNC: 17 U/L — SIGNIFICANT CHANGE UP (ref 10–40)
BARBITURATES UR SCN-MCNC: NEGATIVE — SIGNIFICANT CHANGE UP
BENZODIAZ UR-MCNC: NEGATIVE — SIGNIFICANT CHANGE UP
BILIRUB DIRECT SERPL-MCNC: <0.2 MG/DL — SIGNIFICANT CHANGE UP (ref 0–0.3)
BILIRUB INDIRECT FLD-MCNC: SIGNIFICANT CHANGE UP MG/DL (ref 0.2–1)
BILIRUB SERPL-MCNC: <0.2 MG/DL — SIGNIFICANT CHANGE UP (ref 0.2–1.2)
BILIRUB UR-MCNC: NEGATIVE — SIGNIFICANT CHANGE UP
COCAINE METAB.OTHER UR-MCNC: NEGATIVE — SIGNIFICANT CHANGE UP
COLOR SPEC: YELLOW — SIGNIFICANT CHANGE UP
DIFF PNL FLD: NEGATIVE — SIGNIFICANT CHANGE UP
GLUCOSE UR QL: NEGATIVE MG/DL — SIGNIFICANT CHANGE UP
HIV 1+2 AB+HIV1 P24 AG SERPL QL IA: SIGNIFICANT CHANGE UP
KETONES UR-MCNC: NEGATIVE MG/DL — SIGNIFICANT CHANGE UP
LEUKOCYTE ESTERASE UR-ACNC: NEGATIVE — SIGNIFICANT CHANGE UP
METHADONE UR-MCNC: NEGATIVE — SIGNIFICANT CHANGE UP
NITRITE UR-MCNC: NEGATIVE — SIGNIFICANT CHANGE UP
OPIATES UR-MCNC: POSITIVE
PCP SPEC-MCNC: SIGNIFICANT CHANGE UP
PCP UR-MCNC: NEGATIVE — SIGNIFICANT CHANGE UP
PH UR: 6 — SIGNIFICANT CHANGE UP (ref 5–8)
PROT SERPL-MCNC: 5.7 G/DL — LOW (ref 6–8.3)
PROT UR-MCNC: NEGATIVE MG/DL — SIGNIFICANT CHANGE UP
SP GR SPEC: 1.01 — SIGNIFICANT CHANGE UP (ref 1–1.03)
THC UR QL: NEGATIVE — SIGNIFICANT CHANGE UP
UROBILINOGEN FLD QL: 0.2 MG/DL — SIGNIFICANT CHANGE UP (ref 0.2–1)

## 2024-03-15 PROCEDURE — 76856 US EXAM PELVIC COMPLETE: CPT | Mod: 26,59

## 2024-03-15 PROCEDURE — 93975 VASCULAR STUDY: CPT | Mod: 26

## 2024-03-15 PROCEDURE — 99232 SBSQ HOSP IP/OBS MODERATE 35: CPT

## 2024-03-15 PROCEDURE — 99222 1ST HOSP IP/OBS MODERATE 55: CPT

## 2024-03-15 PROCEDURE — 76830 TRANSVAGINAL US NON-OB: CPT | Mod: 26

## 2024-03-15 PROCEDURE — 93010 ELECTROCARDIOGRAM REPORT: CPT

## 2024-03-15 PROCEDURE — 74018 RADEX ABDOMEN 1 VIEW: CPT | Mod: 26

## 2024-03-15 RX ORDER — SUCRALFATE 1 G
1 TABLET ORAL EVERY 6 HOURS
Refills: 0 | Status: DISCONTINUED | OUTPATIENT
Start: 2024-03-15 | End: 2024-03-16

## 2024-03-15 RX ORDER — PANTOPRAZOLE SODIUM 20 MG/1
40 TABLET, DELAYED RELEASE ORAL EVERY 12 HOURS
Refills: 0 | Status: DISCONTINUED | OUTPATIENT
Start: 2024-03-15 | End: 2024-03-16

## 2024-03-15 RX ORDER — METOCLOPRAMIDE HCL 10 MG
10 TABLET ORAL ONCE
Refills: 0 | Status: COMPLETED | OUTPATIENT
Start: 2024-03-15 | End: 2024-03-15

## 2024-03-15 RX ORDER — AZITHROMYCIN 500 MG/1
500 TABLET, FILM COATED ORAL EVERY 24 HOURS
Refills: 0 | Status: DISCONTINUED | OUTPATIENT
Start: 2024-03-15 | End: 2024-03-16

## 2024-03-15 RX ORDER — PANTOPRAZOLE SODIUM 20 MG/1
40 TABLET, DELAYED RELEASE ORAL
Refills: 0 | Status: DISCONTINUED | OUTPATIENT
Start: 2024-03-15 | End: 2024-03-15

## 2024-03-15 RX ORDER — ACETAMINOPHEN 500 MG
650 TABLET ORAL EVERY 8 HOURS
Refills: 0 | Status: DISCONTINUED | OUTPATIENT
Start: 2024-03-15 | End: 2024-03-16

## 2024-03-15 RX ORDER — MORPHINE SULFATE 50 MG/1
2 CAPSULE, EXTENDED RELEASE ORAL ONCE
Refills: 0 | Status: DISCONTINUED | OUTPATIENT
Start: 2024-03-15 | End: 2024-03-15

## 2024-03-15 RX ADMIN — Medication 1 GRAM(S): at 19:25

## 2024-03-15 RX ADMIN — AZITHROMYCIN 500 MILLIGRAM(S): 500 TABLET, FILM COATED ORAL at 11:09

## 2024-03-15 RX ADMIN — Medication 650 MILLIGRAM(S): at 12:46

## 2024-03-15 RX ADMIN — Medication 1 TABLET(S): at 01:13

## 2024-03-15 RX ADMIN — Medication 650 MILLIGRAM(S): at 23:32

## 2024-03-15 RX ADMIN — OXYCODONE HYDROCHLORIDE 5 MILLIGRAM(S): 5 TABLET ORAL at 09:55

## 2024-03-15 RX ADMIN — MORPHINE SULFATE 2 MILLIGRAM(S): 50 CAPSULE, EXTENDED RELEASE ORAL at 12:50

## 2024-03-15 RX ADMIN — PANTOPRAZOLE SODIUM 40 MILLIGRAM(S): 20 TABLET, DELAYED RELEASE ORAL at 19:26

## 2024-03-15 RX ADMIN — Medication 650 MILLIGRAM(S): at 22:32

## 2024-03-15 RX ADMIN — Medication 1 GRAM(S): at 05:37

## 2024-03-15 RX ADMIN — Medication 10 MILLIGRAM(S): at 14:57

## 2024-03-15 RX ADMIN — ESCITALOPRAM OXALATE 5 MILLIGRAM(S): 10 TABLET, FILM COATED ORAL at 11:14

## 2024-03-15 RX ADMIN — MORPHINE SULFATE 2 MILLIGRAM(S): 50 CAPSULE, EXTENDED RELEASE ORAL at 12:19

## 2024-03-15 RX ADMIN — ONDANSETRON 4 MILLIGRAM(S): 8 TABLET, FILM COATED ORAL at 13:23

## 2024-03-15 RX ADMIN — OXYCODONE HYDROCHLORIDE 5 MILLIGRAM(S): 5 TABLET ORAL at 00:00

## 2024-03-15 RX ADMIN — PANTOPRAZOLE SODIUM 40 MILLIGRAM(S): 20 TABLET, DELAYED RELEASE ORAL at 07:38

## 2024-03-15 RX ADMIN — OXYCODONE HYDROCHLORIDE 5 MILLIGRAM(S): 5 TABLET ORAL at 01:00

## 2024-03-15 RX ADMIN — OXYCODONE HYDROCHLORIDE 5 MILLIGRAM(S): 5 TABLET ORAL at 08:55

## 2024-03-15 RX ADMIN — Medication 1 GRAM(S): at 11:09

## 2024-03-15 NOTE — DISCHARGE NOTE PROVIDER - CARE PROVIDER_API CALL
[facility]iselda [facility]u  Gastroenterology  [address]  [place] [address]  Phone: [phone]  Fax: [phone]  Scheduled Appointment: 03/27/2024

## 2024-03-15 NOTE — DISCHARGE NOTE PROVIDER - NSDCCPTREATMENT_GEN_ALL_CORE_FT
PRINCIPAL PROCEDURE  Procedure: CT abdomen & pelvis  Findings and Treatment: CONTRAST/COMPLICATIONS:  IV Contrast: Omnipaque 350  96 cc administered   4 cc discarded  Oral Contrast: Omnipaque 300  Complications: None reported at time of study completion  PROCEDURE:  CT of the Abdomen and Pelvis was performed.  Sagittal and coronal reformats were performed.  FINDINGS:  LOWER CHEST: Within normal limits.  LIVER: Mild heterogeneity with extensive periportal edema  BILE DUCTS: Normal caliber.  GALLBLADDER: No radiopaque stones. Mildlythickened gallbladder wall with   pericholecystic fluid seen. Recommend additional evaluation with right   upper quadrant ultrasound.  SPLEEN: Small low-attenuation foci within the medial spleen incompletely   characterized.  PANCREAS: Within normal limits.  ADRENALS: Within normal limits.  KIDNEYS/URETERS: Within normal limits.  BLADDER: Within normal limits.  REPRODUCTIVE ORGANS: Uterus and adnexa within normal limits.  BOWEL: No bowel obstruction. Appendix is normal.  PERITONEUM: No ascites.  VESSELS: Within normal limits.  RETROPERITONEUM/LYMPH NODES: No lymphadenopathy.  ABDOMINAL WALL: Within normal limits.  BONES: Within normal limits.  IMPRESSION:  Mild gallbladder wall thickening and pericholecystic fluid. Recommend   additional evaluation with right upper quadrant ultrasound  Nonspecific mild heterogeneity of the liver with periportal edema and   please correlate clinically for hepatitis      SECONDARY PROCEDURE  Procedure: US abdomen RUQ  Findings and Treatment:   ACC: [id number] EXAM:  US ABDOMEN RT UPR QUADRANT   ORDERED BY: ALONZO BURROUGHS   PROCEDURE DATE:  03/14/2024    INTERPRETATION:  CLINICAL INFORMATION: Epigastric/right upper quadrant   pain  COMPARISON: Right upper quadrant sonogram dated 3/13/2024. CT of the   abdomen and pelvis dated 3/13/2024.  TECHNIQUE: Sonography of the right upper quadrant.  FINDINGS:  Liver: Mildly enlarged measuring 18.3 cm.  Bile ducts: Normal caliber. Common bile duct measures 0.2 cm.  Gallbladder: No stones are identified. Borderline wall thickening. No   pericholecystic fluid. Negative sonographic Pabon sign.  Pancreas: Visualized portions are within normal limits.  Right kidney: 10.3 cm. No hydronephrosis.  Ascites: Trace perihepatic ascites.  IVC: Visualized portions are within normal limits.  IMPRESSION:  Borderline gallbladder wall thickening without pericholecystic fluid or   gallbladder stones, a nonspecific finding.  Trace perihepatic ascites.  Mild hepatomegaly.  --- End of Report ---     PRINCIPAL PROCEDURE  Procedure: CT abdomen & pelvis  Findings and Treatment: CONTRAST/COMPLICATIONS:  IV Contrast: Omnipaque 350  96 cc administered   4 cc discarded  Oral Contrast: Omnipaque 300  Complications: None reported at time of study completion  PROCEDURE:  CT of the Abdomen and Pelvis was performed.  Sagittal and coronal reformats were performed.  FINDINGS:  LOWER CHEST: Within normal limits.  LIVER: Mild heterogeneity with extensive periportal edema  BILE DUCTS: Normal caliber.  GALLBLADDER: No radiopaque stones. Mildlythickened gallbladder wall with   pericholecystic fluid seen. Recommend additional evaluation with right   upper quadrant ultrasound.  SPLEEN: Small low-attenuation foci within the medial spleen incompletely   characterized.  PANCREAS: Within normal limits.  ADRENALS: Within normal limits.  KIDNEYS/URETERS: Within normal limits.  BLADDER: Within normal limits.  REPRODUCTIVE ORGANS: Uterus and adnexa within normal limits.  BOWEL: No bowel obstruction. Appendix is normal.  PERITONEUM: No ascites.  VESSELS: Within normal limits.  RETROPERITONEUM/LYMPH NODES: No lymphadenopathy.  ABDOMINAL WALL: Within normal limits.  BONES: Within normal limits.  IMPRESSION:  Mild gallbladder wall thickening and pericholecystic fluid. Recommend   additional evaluation with right upper quadrant ultrasound  Nonspecific mild heterogeneity of the liver with periportal edema and   please correlate clinically for hepatitis      SECONDARY PROCEDURE  Procedure: US abdomen RUQ  Findings and Treatment:   ACC: [id number] EXAM:  US ABDOMEN RT UPR QUADRANT   ORDERED BY: ALONZO BURROUGHS   PROCEDURE DATE:  03/14/2024    INTERPRETATION:  CLINICAL INFORMATION: Epigastric/right upper quadrant   pain  COMPARISON: Right upper quadrant sonogram dated 3/13/2024. CT of the   abdomen and pelvis dated 3/13/2024.  TECHNIQUE: Sonography of the right upper quadrant.  FINDINGS:  Liver: Mildly enlarged measuring 18.3 cm.  Bile ducts: Normal caliber. Common bile duct measures 0.2 cm.  Gallbladder: No stones are identified. Borderline wall thickening. No   pericholecystic fluid. Negative sonographic Pabon sign.  Pancreas: Visualized portions are within normal limits.  Right kidney: 10.3 cm. No hydronephrosis.  Ascites: Trace perihepatic ascites.  IVC: Visualized portions are within normal limits.  IMPRESSION:  Borderline gallbladder wall thickening without pericholecystic fluid or   gallbladder stones, a nonspecific finding.  Trace perihepatic ascites.  Mild hepatomegaly.  --- End of Report ---    Procedure: Transvaginal ultrasound of pelvis and pelvic vessels  Findings and Treatment:   < end of copied text >  PROCEDURE DATE:  03/15/2024    INTERPRETATION:  CLINICAL INFORMATION: Lower abdominal pain. Evaluation   for ovarian torsion.  LMP: 3/1/2024  COMPARISON: None available.  TECHNIQUE:  Endovaginal and transabdominal pelvic sonogram. Color and Spectral   Doppler was performed.  FINDINGS:  Uterus: 6.6 cm x 2.9 cm x 3.9 cm. Subcentimeter nabothian cyst.  Endometrium: 14 mm. Within normal limits. Trilaminar.  Right ovary: 3.6 cm x 1.9 cm x 3.5 cm. Dominant follicle measuring 2.4   cm. Normal arterial and venous waveforms.  Left ovary: 4.3 cm x 1.7 cm x 2.4 cm. Within normal limits. Normal   arterial and venous waveforms.  Fluid: None.  IMPRESSION:  Normal pelvic sonogram.  No evidence of ovarian torsion, as clinically questioned.  < from: US Transvaginal (03.15.24 @ 19:07) >

## 2024-03-15 NOTE — DISCHARGE NOTE PROVIDER - NSDCMRMEDTOKEN_GEN_ALL_CORE_FT
Ty premenstrual supplement: 2 gum orally once a day  KlonoPIN 0.5 mg oral tablet: 0.5 tab(s) orally once a day as needed for -  Lexapro 5 mg oral tablet: 2 tab(s) orally once a day  oxyCODONE 5 mg oral tablet: 1 tab(s) orally every 6 hours, As needed, Moderate and Severe Pain MDD:4  Tylenol 325 mg oral tablet: 2 tab(s) orally every 4 hours, As Needed  Vyvanse 40 mg oral capsule: 1 cap(s) orally once a day   Ty premenstrual supplement: 2 gum orally once a day  KlonoPIN 0.5 mg oral tablet: 0.5 tab(s) orally once a day as needed for -  Lexapro 5 mg oral tablet: 2 tab(s) orally once a day  Tylenol 325 mg oral tablet: 2 tab(s) orally every 4 hours, As Needed  Vyvanse 40 mg oral capsule: 1 cap(s) orally once a day   azithromycin 500 mg oral tablet: 1 tab(s) orally every 24 hours  Ty premenstrual supplement: 2 gum orally once a day  KlonoPIN 0.5 mg oral tablet: 0.5 tab(s) orally once a day as needed for -  Lexapro 5 mg oral tablet: 2 tab(s) orally once a day  pantoprazole 40 mg oral delayed release tablet: 1 tab(s) orally every 12 hours  sucralfate 1 g oral tablet: 1 tab(s) orally every 6 hours  Vyvanse 40 mg oral capsule: 1 cap(s) orally once a day

## 2024-03-15 NOTE — PROGRESS NOTE ADULT - PROBLEM SELECTOR PLAN 2
mildly uptrending AST/ALT over course of last 3 days (multiple ED visits. Pabon sign positive on exam, RUQ US with Mild pericholecystic fluid without gallstones or gallbladder wall thickening. Mild perihepatic ascites This are nonspecific findings but may be secondary to volume overload. Mild hepatomegaly. Currently low suspicion for acute appendicitis and pericholecystic fluid related to viral GI infection.   - c/t monitor for symptoms mildly uptrending AST/ALT over course of last 3 days (multiple ED visits. Pabon sign positive on exam, RUQ US with Mild pericholecystic fluid without gallstones or gallbladder wall thickening. Mild perihepatic ascites This are nonspecific findings but may be secondary to volume overload. Mild hepatomegaly. Currently low suspicion for acute appendicitis and pericholecystic fluid related to viral GI infection.   - c/t monitor for symptoms  - f/u GI recs

## 2024-03-15 NOTE — PROGRESS NOTE ADULT - ATTENDING COMMENTS
28F with PMH of partial splenectomy and ADHD presenting with abdominal pain, diarrhea and EPEC/EAEC gastroenteritis.  Admitted for further treatment/management.      Physical exam  General: experiencing some pain, sitting up in bed  heent: ncat, mmm  cards: rrr, normal S1S2, no mrg  pulm: ctab, no wheeze, crackles, rales or rhonchi  ab: soft, epigastric tenderness, no ttp of RLQ or LLQ, normoactive bowel sounds  ext: wwp, no edema, no calf asymmetry  neuro: speech is fluent, no facial asymmetry, follows commands, no acute deficits noted  skin: warm and dry, no obvious rashes or lesions     Plan  - GI consult  - continue on azithromycin  - diarrhea has stopped at this point, and patient is able to tolerate some PO.  She reports she has been urinating without any difficulty.   - pending pelvic ultrasound for evaluation of ovarian torsion/ruptured cyst.  Patient reports history of ovarian cysts  - PO oxycodone for pain regimen  - hepatitis panel negative    40 minutes spent on this encounter, including face to face with patient, care coordination and documentation.

## 2024-03-15 NOTE — DISCHARGE NOTE PROVIDER - NSDCFUSCHEDAPPT_GEN_ALL_CORE_FT
[facility]  GASTRO [address]  Scheduled Appointment: 03/27/2024

## 2024-03-15 NOTE — PROGRESS NOTE ADULT - SUBJECTIVE AND OBJECTIVE BOX
OVERNIGHT EVENTS:  SUBJECTIVE: Patient was seen and examined at bedside.      VITAL SIGNS:  T(F): 98.2 (24 @ 22:45)  HR: 56 (24 @ 22:45)  BP: 106/63 (24 @ 22:45)  RR: 17 (24 @ 22:45)  SpO2: 100% (24 @ 22:45)  Wt(kg): --    PHYSICAL EXAM  GENERAL: NAD, lying in bed comfortably  HEAD:  Atraumatic, normocephalic  EYES: EOMI, PERRLA, conjunctiva and sclera clear  ENT: Moist mucous membranes  NECK: Supple, no JVD  HEART: Regular rate and rhythm, no murmurs, rubs, or gallops  LUNGS: Unlabored respirations.  Clear to auscultation bilaterally, no crackles, wheezing, or rhonchi  ABDOMEN: Soft, nontender, nondistended, +BS  EXTREMITIES: 2+ peripheral pulses bilaterally. No clubbing, cyanosis, or edema  NERVOUS SYSTEM:  A&Ox3, no focal deficits   SKIN: No rashes or lesions    MEDICATIONS  (STANDING):  azithromycin   Tablet 500 milliGRAM(s) Oral every 24 hours  clonazePAM  Tablet 0.25 milliGRAM(s) Oral daily  escitalopram 5 milliGRAM(s) Oral daily  influenza   Vaccine 0.5 milliLiter(s) IntraMuscular once  pantoprazole    Tablet 40 milliGRAM(s) Oral before breakfast  sucralfate 1 Gram(s) Oral every 6 hours    MEDICATIONS  (PRN):  acetaminophen     Tablet .. 650 milliGRAM(s) Oral every 6 hours PRN Temp greater or equal to 38C (100.4F), Mild Pain (1 - 3)  LORazepam   Injectable 2 milliGRAM(s) IntraMuscular every 1 hour PRN CIWA-Ar score 8 or greater  morphine  - Injectable 2 milliGRAM(s) IV Push every 4 hours PRN Severe Pain (7 - 10)  ondansetron Injectable 4 milliGRAM(s) IV Push every 8 hours PRN Nausea and/or Vomiting  oxyCODONE    IR 5 milliGRAM(s) Oral every 6 hours PRN Moderate Pain (4 - 6)      Allergies    No Known Allergies    Intolerances        LABS:                        12.8   7.16  )-----------( 179      ( 14 Mar 2024 19:21 )             37.8         140  |  103  |  5<L>  ----------------------------<  104<H>  3.9   |  28  |  0.64    Ca    10.1      14 Mar 2024 19:21    TPro  6.5  /  Alb  4.3  /  TBili  0.3  /  DBili  x   /  AST  32  /  ALT  43  /  AlkPhos  51  14    PT/INR - ( 13 Mar 2024 16:49 )   PT: 11.4 sec;   INR: 1.01          PTT - ( 13 Mar 2024 16:49 )  PTT:34.9 sec  Urinalysis Basic - ( 14 Mar 2024 20:32 )    Color: Yellow / Appearance: Clear / S.007 / pH: x  Gluc: x / Ketone: Negative mg/dL  / Bili: Negative / Urobili: 0.2 mg/dL   Blood: x / Protein: Negative mg/dL / Nitrite: Negative   Leuk Esterase: Negative / RBC: x / WBC x   Sq Epi: x / Non Sq Epi: x / Bacteria: x          MICROBIOLOGY    Urinalysis with Rflx Culture (collected 24 @ 20:32)        RADIOLOGY & ADDITIONAL TESTS:  Reviewed OVERNIGHT EVENTS: ORALIA  SUBJECTIVE: Patient was seen and examined at bedside. Patient states epigastric pain has improved, more tolerable and no longer radiating to lower quadrants. Drinking fluids. Denies N/V, SOB, dysuria, or diarrhea.    VITAL SIGNS:  T(F): 98.2 (24 @ 22:45)  HR: 56 (24 @ 22:45)  BP: 106/63 (24 @ 22:45)  RR: 17 (24 @ 22:45)  SpO2: 100% (24 @ 22:45)  Wt(kg): --    PHYSICAL EXAM  GENERAL: NAD, sleeping in bed comfortably, later appears mildly uncomfortable  HEAD:  Atraumatic, normocephalic  EYES: EOMI, conjunctiva and sclera clear  HEART: Regular rate and rhythm, no murmurs, rubs, or gallops  LUNGS: Unlabored respirations.  Clear to auscultation bilaterally, no crackles, wheezing, or rhonchi  ABDOMEN: Soft, nondistended, +BS. Epigastric region and RUQ TTP.  NERVOUS SYSTEM:  A&Ox3, no focal deficits     MEDICATIONS  (STANDING):  azithromycin   Tablet 500 milliGRAM(s) Oral every 24 hours  clonazePAM  Tablet 0.25 milliGRAM(s) Oral daily  escitalopram 5 milliGRAM(s) Oral daily  influenza   Vaccine 0.5 milliLiter(s) IntraMuscular once  pantoprazole    Tablet 40 milliGRAM(s) Oral before breakfast  sucralfate 1 Gram(s) Oral every 6 hours    MEDICATIONS  (PRN):  acetaminophen     Tablet .. 650 milliGRAM(s) Oral every 6 hours PRN Temp greater or equal to 38C (100.4F), Mild Pain (1 - 3)  LORazepam   Injectable 2 milliGRAM(s) IntraMuscular every 1 hour PRN CIWA-Ar score 8 or greater  morphine  - Injectable 2 milliGRAM(s) IV Push every 4 hours PRN Severe Pain (7 - 10)  ondansetron Injectable 4 milliGRAM(s) IV Push every 8 hours PRN Nausea and/or Vomiting  oxyCODONE    IR 5 milliGRAM(s) Oral every 6 hours PRN Moderate Pain (4 - 6)      Allergies    No Known Allergies    Intolerances        LABS:                        12.8   7.16  )-----------( 179      ( 14 Mar 2024 19:21 )             37.8     03-14    140  |  103  |  5<L>  ----------------------------<  104<H>  3.9   |  28  |  0.64    Ca    10.1      14 Mar 2024 19:21    TPro  6.5  /  Alb  4.3  /  TBili  0.3  /  DBili  x   /  AST  32  /  ALT  43  /  AlkPhos  51  -14    PT/INR - ( 13 Mar 2024 16:49 )   PT: 11.4 sec;   INR: 1.01          PTT - ( 13 Mar 2024 16:49 )  PTT:34.9 sec  Urinalysis Basic - ( 14 Mar 2024 20:32 )    Color: Yellow / Appearance: Clear / S.007 / pH: x  Gluc: x / Ketone: Negative mg/dL  / Bili: Negative / Urobili: 0.2 mg/dL   Blood: x / Protein: Negative mg/dL / Nitrite: Negative   Leuk Esterase: Negative / RBC: x / WBC x   Sq Epi: x / Non Sq Epi: x / Bacteria: x          MICROBIOLOGY    Urinalysis with Rflx Culture (collected 24 @ 20:32)        RADIOLOGY & ADDITIONAL TESTS:  Reviewed OVERNIGHT EVENTS: ORALIA  SUBJECTIVE: Patient was seen and examined at bedside. Patient states epigastric pain has improved w/ pain regimen, more tolerable and no longer radiating to lower quadrants. Drinking fluids. Denies N/V, SOB, dysuria, or diarrhea.    VITAL SIGNS:  T(F): 98.2 (24 @ 22:45)  HR: 56 (24 @ 22:45)  BP: 106/63 (24 @ 22:45)  RR: 17 (24 @ 22:45)  SpO2: 100% (24 @ 22:45)  Wt(kg): --    PHYSICAL EXAM  GENERAL: NAD, sleeping in bed comfortably, later appears mildly uncomfortable  HEAD:  Atraumatic, normocephalic  EYES: EOMI, conjunctiva and sclera clear  HEART: Regular rate and rhythm, no murmurs, rubs, or gallops  LUNGS: Unlabored respirations.  Clear to auscultation bilaterally, no crackles, wheezing, or rhonchi  ABDOMEN: Soft, nondistended, +BS. Epigastric region and RUQ TTP.  NERVOUS SYSTEM:  A&Ox3, no focal deficits     MEDICATIONS  (STANDING):  azithromycin   Tablet 500 milliGRAM(s) Oral every 24 hours  clonazePAM  Tablet 0.25 milliGRAM(s) Oral daily  escitalopram 5 milliGRAM(s) Oral daily  influenza   Vaccine 0.5 milliLiter(s) IntraMuscular once  pantoprazole    Tablet 40 milliGRAM(s) Oral before breakfast  sucralfate 1 Gram(s) Oral every 6 hours    MEDICATIONS  (PRN):  acetaminophen     Tablet .. 650 milliGRAM(s) Oral every 6 hours PRN Temp greater or equal to 38C (100.4F), Mild Pain (1 - 3)  LORazepam   Injectable 2 milliGRAM(s) IntraMuscular every 1 hour PRN CIWA-Ar score 8 or greater  morphine  - Injectable 2 milliGRAM(s) IV Push every 4 hours PRN Severe Pain (7 - 10)  ondansetron Injectable 4 milliGRAM(s) IV Push every 8 hours PRN Nausea and/or Vomiting  oxyCODONE    IR 5 milliGRAM(s) Oral every 6 hours PRN Moderate Pain (4 - 6)      Allergies    No Known Allergies    Intolerances        LABS:                        12.8   7.16  )-----------( 179      ( 14 Mar 2024 19:21 )             37.8     03-14    140  |  103  |  5<L>  ----------------------------<  104<H>  3.9   |  28  |  0.64    Ca    10.1      14 Mar 2024 19:21    TPro  6.5  /  Alb  4.3  /  TBili  0.3  /  DBili  x   /  AST  32  /  ALT  43  /  AlkPhos  51  03-14    PT/INR - ( 13 Mar 2024 16:49 )   PT: 11.4 sec;   INR: 1.01          PTT - ( 13 Mar 2024 16:49 )  PTT:34.9 sec  Urinalysis Basic - ( 14 Mar 2024 20:32 )    Color: Yellow / Appearance: Clear / S.007 / pH: x  Gluc: x / Ketone: Negative mg/dL  / Bili: Negative / Urobili: 0.2 mg/dL   Blood: x / Protein: Negative mg/dL / Nitrite: Negative   Leuk Esterase: Negative / RBC: x / WBC x   Sq Epi: x / Non Sq Epi: x / Bacteria: x          MICROBIOLOGY    Urinalysis with Rflx Culture (collected 24 @ 20:32)        RADIOLOGY & ADDITIONAL TESTS:  Reviewed OVERNIGHT EVENTS: ORALIA  SUBJECTIVE: Patient was seen and examined at bedside. Patient states epigastric pain has improved w/ pain regimen, more tolerable and no longer radiating to lower quadrants. Drinking fluids. Denies N/V, SOB, dysuria, or diarrhea.    VITAL SIGNS:  T(F): 98.2 (24 @ 22:45)  HR: 56 (24 @ 22:45)  BP: 106/63 (24 @ 22:45)  RR: 17 (24 @ 22:45)  SpO2: 100% (24 @ 22:45)  Wt(kg): --    PHYSICAL EXAM  GENERAL: NAD, sleeping in bed comfortably, later appears mildly uncomfortable  HEAD:  Atraumatic, normocephalic  EYES: EOMI, conjunctiva and sclera clear  HEART: Regular rate and rhythm, no murmurs, rubs, or gallops  LUNGS: Unlabored respirations.  Clear to auscultation bilaterally, no crackles, wheezing, or rhonchi  ABDOMEN: Soft, nondistended, +BS. Epigastric region and RUQ TTP. + Pabon sign  NERVOUS SYSTEM:  A&Ox3, no focal deficits  Skin: isolated maculopapular < 1mm in diameter rash in L flank, possible healing acne     MEDICATIONS  (STANDING):  azithromycin   Tablet 500 milliGRAM(s) Oral every 24 hours  clonazePAM  Tablet 0.25 milliGRAM(s) Oral daily  escitalopram 5 milliGRAM(s) Oral daily  influenza   Vaccine 0.5 milliLiter(s) IntraMuscular once  pantoprazole    Tablet 40 milliGRAM(s) Oral before breakfast  sucralfate 1 Gram(s) Oral every 6 hours    MEDICATIONS  (PRN):  acetaminophen     Tablet .. 650 milliGRAM(s) Oral every 6 hours PRN Temp greater or equal to 38C (100.4F), Mild Pain (1 - 3)  LORazepam   Injectable 2 milliGRAM(s) IntraMuscular every 1 hour PRN CIWA-Ar score 8 or greater  morphine  - Injectable 2 milliGRAM(s) IV Push every 4 hours PRN Severe Pain (7 - 10)  ondansetron Injectable 4 milliGRAM(s) IV Push every 8 hours PRN Nausea and/or Vomiting  oxyCODONE    IR 5 milliGRAM(s) Oral every 6 hours PRN Moderate Pain (4 - 6)      Allergies    No Known Allergies    Intolerances        LABS:                        12.8   7.16  )-----------( 179      ( 14 Mar 2024 19:21 )             37.8     03-14    140  |  103  |  5<L>  ----------------------------<  104<H>  3.9   |  28  |  0.64    Ca    10.1      14 Mar 2024 19:21    TPro  6.5  /  Alb  4.3  /  TBili  0.3  /  DBili  x   /  AST  32  /  ALT  43  /  AlkPhos  51  03-14    PT/INR - ( 13 Mar 2024 16:49 )   PT: 11.4 sec;   INR: 1.01          PTT - ( 13 Mar 2024 16:49 )  PTT:34.9 sec  Urinalysis Basic - ( 14 Mar 2024 20:32 )    Color: Yellow / Appearance: Clear / S.007 / pH: x  Gluc: x / Ketone: Negative mg/dL  / Bili: Negative / Urobili: 0.2 mg/dL   Blood: x / Protein: Negative mg/dL / Nitrite: Negative   Leuk Esterase: Negative / RBC: x / WBC x   Sq Epi: x / Non Sq Epi: x / Bacteria: x          MICROBIOLOGY    Urinalysis with Rflx Culture (collected 24 @ 20:32)        RADIOLOGY & ADDITIONAL TESTS:  Reviewed

## 2024-03-15 NOTE — PROGRESS NOTE ADULT - ASSESSMENT
29 y/o F w/ PMH of ADHD, partial splenectomy for cyst, and recent ED visits x2 @[facility] presenting w/ persistently severe abdominal pain. 29 y/o F w/ PMH of ADHD, partial splenectomy for cyst, and recent ED visits x2 @[facility] presenting w/ persistently severe abdominal pain i/s/o E. coli (EPEC/EAEC) gastroenteritis. Course c/b persistent pain. Admitted for further management.

## 2024-03-15 NOTE — CONSULT NOTE ADULT - ATTENDING COMMENTS
29yo female with PMH ADHD, hx large splenic cyst s/p fenestration in 2020, recent trip to [place] 1 week ago c/b ?parasitic GI infection, presenting with epigastric/lower abdominal pain, diarrhea, found to have EPEC/EAEC. Imaging notable for periportal edema and mild hepatomegaly. GI/Hepatology consulted for management recommendations.     Pt reports that during her trip in [place], she developed diarrhea, and visualized worms in her stool. She took 1 dose of mebendazole/quinfamide and the diarrhea resolved at that time.    Pt had mild transaminase elevations yesterday that have essentially normalized today (and surprisingly do not correlate with the degree of her liver imaging findings). She denies a prior hx of liver disease.     Today, she complains of ongoing epigastric pain, worse with eating. Assoc with nausea, but no f/c, vomiting. Diarrhea has stopped. Also denies cp, sob, cough, dysuria, abnormal vaginal discharge/pain.    Workup thus far:  HAV IgM neg  HBcAb IgM neg  HBsAg neg  HCV Ab neg    Recommendations:  - Check Abd doppler to assess patency of portal and hepatic veins  - Agree with Azithromycin for management of EPEC/EAEC.  - Given reported pelvic pain and periportal edema seen on imaging, could consider Ovi Cristi Zach syndrome -- recommend STD testing (C trachomatis, N gonorrhea, mycoplasma genitalum; this was discussed with the pt who agrees to undergo STD testing).  - If epigastric pain persists, consider EGD next week with biopsies.  - PPI BID  - Continue to trend LFTs      Courtney Langston MD  Hepatology Attending

## 2024-03-15 NOTE — CHART NOTE - NSCHARTNOTEFT_GEN_A_CORE
PDI	Current Rx	Drug Type	Rx Written	Rx Dispensed	Drug	Quantity	Days Supply	Prescriber Name	Prescriber YOGI #	Payment Method	Dispenser  A	Y	S	02/13/2024	02/29/2024	lisdexamfetamine 40 mg capsule	30	30	BehrEnrique	[id number]	Insurance	[facility] [id number]  A	Y	B	02/13/2024	02/22/2024	clonazepam 0.5 mg tablet	90	30	BehrEnrique	[id number]	Insurance	[facility] [id number]  A	Y	S	02/20/2024	02/22/2024	dextroamp-amphetamin 10 mg tab	90	30	Behr, Enrique Singh	[id number]	Insurance	[facility] [id number]  A	N	S	01/22/2024	01/25/2024	dextroamp-amphetamin 10 mg tab	90	30	Behr, Enrique Singh	[id number]	Insurance	[facility] [id number]  A	N	S	12/18/2023	12/20/2023	dextroamp-amphetamin 10 mg tab	90	30	BehrEnrique	[id number]	Insurance	[facility] [id number]  A	N	S	12/18/2023	12/20/2023	lisdexamfetamine 40 mg capsule	30	30	BehrEnrique	[id number]	Insurance	[facility] [id number]  A	N	S	11/17/2023	11/20/2023	dextroamp-amphetamin 10 mg tab	90	30	Behr, Enrique Singh	[id number]	Insurance	[facility] [id number]  A	N	S	11/17/2023	11/20/2023	lisdexamfetamine 40 mg capsule	30	30	BehrEnrique	[id number]	Insurance	[facility] [id number]  A	N	S	09/27/2023	10/18/2023	lisdexamfetamine 40 mg capsule	30	30	BehrEnrique	[id number]	Insurance	[facility] [id number]  A	N	S	10/02/2023	10/10/2023	dextroamp-amphetamin 10 mg tab	90	30	BehrEnrique	[id number]	Insurance	[facility] [id number]  A	N	B	09/27/2023	09/28/2023	clonazepam 0.5 mg tablet	90	30	BehrEnrique	[id number]	Insurance	[facility] [id number]  A	N	S	09/12/2023	09/15/2023	lisdexamfetamine 40 mg capsule	30	30	Behr, Enrique Singh	[id number]	Insurance	[facility] [id number]  A	N	S	08/28/2023	08/29/2023	dextroamp-amphetamin 10 mg tab	90	30	Behr, Enrique Singh	[id number]	Insurance	[facility] [id number]  A	N	S	07/27/2023	07/28/2023	vyvanse 40 mg capsule	30	30	Behr, Enrique Singh	[id number]	Insurance	[facility] [id number]  A	N	S	07/27/2023	07/28/2023	dextroamp-amphetamin 10 mg tab	90	30	Behr, Enrique Singh	[id number]	Insurance	[facility] [id number]  A	N	S	06/20/2023	06/27/2023	dextroamp-amphetamin 10 mg tab	90	30	Behr, Enrique Singh MD	[id number]	Insurance	[facility] [id number]  A	N	S	06/20/2023	06/23/2023	vyvanse 40 mg capsule	30	30	Behr, Enrique Singh MD	[id number]	Insurance	[facility] [id number]  A	N	S	05/24/2023	05/25/2023	dextroamp-amphetamin 10 mg tab	90	30	Behr, Enrique Singh MD	[id number]	Insurance	[facility] [id number]  A	N	S	05/09/2023	05/10/2023	vyvanse 40 mg capsule	30	30	Behr, Enrique Singh MD	[id number]	Insurance	[facility] [id number]  A	N	S	04/18/2023	04/24/2023	dextroamp-amphetamin 10 mg tab	90	30	Behr, Enrique Singh MD	[id number]	Insurance	[facility] [id number]  A	N	S	03/29/2023	04/02/2023	vyvanse 40 mg capsule	30	30	Behr, Enrique Singh MD	[id number]	Insurance	[facility] [id number]

## 2024-03-15 NOTE — PROGRESS NOTE ADULT - PROBLEM SELECTOR PLAN 6
F: s/p 1L NS   E: replete PRN   N: regular (patient choses to avoid PO at this time)  GI ppx: none indicated   DVT ppx: IMPROVE 0 F: none  E: replete PRN   N: regular (patient choses to avoid PO at this time)  GI ppx: none indicated, SCDs  DVT ppx: IMPROVE 0  Dispo: home once clinically improved

## 2024-03-15 NOTE — DISCHARGE NOTE PROVIDER - HOSPITAL COURSE
#Discharge: do not delete    VAUGHN PLEITEZ is a 28y Female with a PMH of ADHD, partial splenectomy for symptomatic cyst (2020), recent presumed parasite infection s/p OTC mebendazole, and recent ED visits x2 @[facility] who presented to [facility] for persistently severe abdominal pain. Patient recently returned from [place] 1 week ago, where she took anti-parasitic meds after seeing worms in stool and experiencing diarrhea. After returning 3/10, she began experiencing epigastric pain a/w nausea and NBNB emesis x1. Presented to ED 3/12 and discharged after pain control w/ plan to be evaluated by OP GI. Patient returned to [facility] ED on 3/13 and 3/14 and was subsequently admitted to [facility] on 3/14 for pain control and w/u. Exam significant for epigastric tenderness radiating to b/l lower quadrants. Afebrile and VS wnl, no leukocytosis, but GI PCR found to be +EPEC, EAEC, given 1 dose TMP-SMX in ED and started on azithromycin (3/15-18). Given morphine and 1L NS and started on pain regimen of oxycodone 5mg q6h. Clinically stable and reports pain is controlled. Cleared*** by spleen surgeon (Dr. Freire) and to f/u GI OP.    Problem List/Main Diagnoses (system-based):   # Abdominal pain.   ·  Plan: 7 days in duration with questionable worms in stool s/p [place] trip and mebendazole treatment. patient denies blood in stool hgb stable, GI PCR with EPEC and EAEC. CTAP w/o bowel pathology noted. Given hx of NBNB vomiting and daily use of marijuana. canabis related emesis unlikely given emesis has subsided. physical exam also notable for radiation to lower quadrants and CVA tenderness (UA negative) additional imaging to r/o ovarian torsion to be performed, nephrolithiasis unlikely given Kidneys and  were WNL on CTAP 3/13. Hepatitis labs wnl. UTox +opioids, otherwise unremarkable. Stool O&P _____. Pain controlled w/ oxy 5mg q6h PRN.  - start azithromycin 500 q24h x3d  - start ppi, Carafate   - pain management   - consider GI consult in AM  - zofran PRN   - f/u stool O&P  - f/u pelvic US to r/o ovarian torsion.    #Thickening of wall of gallbladder with pericholecystic fluid.   ·  Plan: mildly uptrending AST/ALT over course of last 3 days (multiple ED visits. Pabon sign positive on exam, RUQ US with Mild pericholecystic fluid without gallstones or gallbladder wall thickening. Mild perihepatic ascites This are nonspecific findings but may be secondary to volume overload. Mild hepatomegaly. Currently low suspicion for acute appendicitis and pericholecystic fluid related to viral GI infection.   - c/t monitor for symptoms.    # History of alcohol use.   ·  Plan: Drinks 2 alcoholic beverages a night on weeknights and 5 a day on weekends. No hx of withdrawal, intubations, ICU admissions.   - [facility] protocol.    # ADD (attention deficit disorder).   ·  Plan: on home vyvanse 40mg PO qd  - c/w home med.    # Anxiety.   ·  Plan: on home klonipin 0.25mg PO qd + lexapro 5mg PO qd  - c/w home meds.      Patient was discharged to: home.    New medications:   Changes to old medications:  Medications that were stopped:    Items to follow up as outpatient:  - f/u GI OP    Physical exam at the time of discharge:       LABS & STUDIES:   #Discharge: do not delete    VAUGHN PLEITEZ is a 28y Female with a PMH of ADHD, partial splenectomy for symptomatic cyst (2020), recent presumed parasite infection s/p OTC mebendazole, and recent ED visits x2 @[facility] who presented to [facility] for persistently severe abdominal pain. Patient recently returned from [place] 1 week ago, where she took anti-parasitic meds after seeing worms in stool and experiencing diarrhea. After returning 3/10, she began experiencing epigastric pain a/w nausea and NBNB emesis x1. Presented to ED 3/12 and discharged after pain control w/ plan to be evaluated by OP GI. Patient returned to [facility] ED on 3/13 and 3/14 and was subsequently admitted to [facility] on 3/14 for pain control and w/u. Exam significant for epigastric tenderness radiating to b/l lower quadrants. Afebrile and VS wnl, no leukocytosis, but GI PCR found to be +EPEC, EAEC, given 1 dose TMP-SMX in ED and started on azithromycin (3/15-18). Given morphine and 1L NS and started on pain regimen of oxycodone 5mg q6h. Clinically stable and reports pain is controlled. Abdominal tenderness improved on exam, no longer radiating. Cleared*** by spleen surgeon (Dr. Freire) and to f/u GI OP.    Problem List/Main Diagnoses (system-based):   # Abdominal pain.   ·  Plan: 7 days in duration with questionable worms in stool s/p [place] trip and mebendazole treatment. patient denies blood in stool hgb stable, GI PCR with EPEC and EAEC. CTAP w/o bowel pathology noted. Given hx of NBNB vomiting and daily use of marijuana. canabis related emesis unlikely given emesis has subsided. physical exam also notable for radiation to lower quadrants and CVA tenderness (UA negative) additional imaging to r/o ovarian torsion to be performed, nephrolithiasis unlikely given Kidneys and  were WNL on CTAP 3/13. Hepatitis labs wnl. UTox +opioids, otherwise unremarkable. Stool O&P _____. Pain controlled w/ oxy 5mg q6h PRN.  - start azithromycin 500 q24h x3d  - start ppi, Carafate   - pain management   - consider GI consult in AM  - zofran PRN   - f/u stool O&P  - f/u pelvic US to r/o ovarian torsion.    #Thickening of wall of gallbladder with pericholecystic fluid.   ·  Plan: mildly uptrending AST/ALT over course of last 3 days (multiple ED visits. Pabon sign positive on exam, RUQ US with Mild pericholecystic fluid without gallstones or gallbladder wall thickening. Mild perihepatic ascites This are nonspecific findings but may be secondary to volume overload. Mild hepatomegaly. Currently low suspicion for acute appendicitis and pericholecystic fluid related to viral GI infection.   - c/t monitor for symptoms.    # History of alcohol use.   ·  Plan: Drinks 2 alcoholic beverages a night on weeknights and 5 a day on weekends. No hx of withdrawal, intubations, ICU admissions.   - [facility] protocol.    # ADD (attention deficit disorder).   ·  Plan: on home vyvanse 40mg PO qd  - c/w home med.    # Anxiety.   ·  Plan: on home klonipin 0.25mg PO qd + lexapro 5mg PO qd  - c/w home meds.      Patient was discharged to: home.    New medications:   Changes to old medications:  Medications that were stopped:    Items to follow up as outpatient:  - f/u GI OP    Physical exam at the time of discharge:       LABS & STUDIES:   #Discharge: do not delete    VAUGHN PLEITEZ is a 28y Female with a PMH of ADHD, partial splenectomy for symptomatic cyst (2020), recent presumed parasite infection s/p OTC mebendazole, and recent ED visits x2 @[facility] who presented to [facility] for persistently severe epigastric abdominal pain. Pt does not meet sepsis criteria, GI PCR found to be +EPEC, EAEC. Pending stool O+P. CT abd w/ nonspecific heterogeneous liver and periportal edema and pericholecystic fluid (re-demonstrated on RUQ). Managed with symptomatic treatment. Pelvis US w/ ____. GI consulted and recommending _____. Stable for discharge with GI follow-up.     Problem List/Main Diagnoses (system-based):   # Abdominal pain.   Pt w/ 7 days of abd pain with questionable worms in stool s/p [place] trip and mebendazole treatment. Patient denies blood in stool hgb stable, GI PCR with EPEC and EAEC. CTAP w/o bowel pathology noted. Given hx of NBNB vomiting and daily use of marijuana. Cannabis related emesis unlikely given emesis has subsided. Physical exam also notable for radiation to lower quadrants and CVA tenderness (UA negative) additional imaging to r/o ovarian torsion to be performed, nephrolithiasis unlikely given Kidneys and  were WNL on CTAP 3/13. Hepatitis labs wnl. UTox +opioids, otherwise unremarkable. Stool O&P _____. GI consulted _____. Pelvic US ____.   -**add d/c abx??**  - **add PPI??**  - outpatient GI follow-up    #Thickening of wall of gallbladder with pericholecystic fluid.   Pt w/ mildly uptrending AST/ALT over course of last 3 days (multiple ED visits. Pabon sign positive on exam, RUQ US with Mild pericholecystic fluid without gallstones or gallbladder wall thickening. Mild perihepatic ascites This are nonspecific findings but may be secondary to volume overload. Mild hepatomegaly. Currently low suspicion for acute appendicitis and pericholecystic fluid related to viral GI infection. GI consulted.   - outpatient GI follow-up    # History of alcohol use.   Drinks 2 alcoholic beverages a night on weeknights and 5 a day on weekends. No hx of withdrawal, intubations, ICU admissions.   - CIWA protocol while inpatient  - outpatient follow-up    # ADD (attention deficit disorder).   On home vyvanse 40mg PO qd  - c/w home med.    # Anxiety  On home klonipin 0.25mg PO qd + lexapro 5mg PO qd  - c/w home meds.    Patient was discharged to: home.    New medications: **add new meds **  Changes to old medications: none  Medications that were stopped: none    Items to follow up as outpatient: outpatient GI follow-up    Physical exam at the time of discharge:   GENERAL: NAD, sleeping in bed comfortably, later appears mildly uncomfortable  HEAD:  Atraumatic, normocephalic  EYES: EOMI, conjunctiva and sclera clear  HEART: Regular rate and rhythm, no murmurs, rubs, or gallops  LUNGS: Unlabored respirations.  Clear to auscultation bilaterally, no crackles, wheezing, or rhonchi  ABDOMEN: Soft, nondistended, +BS. Epigastric region and RUQ TTP. + Pabon sign  NERVOUS SYSTEM:  A&Ox3, no focal deficits  Skin: isolated maculopapular < 1mm in diameter rash in L flank, possible healing acne    #Discharge: do not delete    VAUGHN PLEITEZ is a 28y Female with a PMH of ADHD, partial splenectomy for symptomatic cyst (2020), recent presumed parasite infection s/p OTC mebendazole, and recent ED visits x2 @[facility] who presented to [facility] for persistently severe epigastric abdominal pain. Pt does not meet sepsis criteria, GI PCR found to be +EPEC, EAEC. Pending stool O+P. CT abd w/ nonspecific heterogeneous liver and periportal edema and pericholecystic fluid (re-demonstrated on RUQ). Managed with symptomatic treatment. Pelvis US normal with no evidence of ovarian torsion. GI consulted and recommending azithromycin x3 days for EPEC/EAEC treatment, PPI BID, and STD testing. Stable for discharge with GI follow-up.     Problem List/Main Diagnoses (system-based):   # Abdominal pain.   Pt w/ 7 days of abd pain with questionable worms in stool s/p [place] trip and mebendazole treatment. Patient denies blood in stool hgb stable, GI PCR with EPEC and EAEC. CTAP w/o bowel pathology noted. Given hx of NBNB vomiting and daily use of marijuana. Cannabis related emesis unlikely given emesis has subsided. Physical exam also notable for radiation to lower quadrants and CVA tenderness (UA negative) additional imaging to r/o ovarian torsion to be performed, nephrolithiasis unlikely given Kidneys and  were WNL on CTAP 3/13. Hepatitis labs wnl. UTox +opioids, otherwise unremarkable. Stool O&P negative. GI consulted, recommending continuing PPI BID and performing STI testing to rule out [id number] syndrome. Pelvic US revealing normal pelvic sonogram. Pain resolved at time of discharge.   No evidence of ovarian torsion.   - S/p azithromycin 500mg QD 03/15-03/16. Continue azithromycin 500mg for one more day 03/17 to complete 3-day course.   - Continue with pantoprazole 40mg BID until outpatient GI follow-up   - Continue carafate 1g q6h until outpatient GI follow-up  - Avoid NSAIDs and opioids   - F/u STI testing   - outpatient GI follow-up    #Thickening of wall of gallbladder with pericholecystic fluid.   Pt w/ mildly uptrending AST/ALT over course of last 3 days (multiple ED visits. Pabon sign positive on exam, RUQ US with Mild pericholecystic fluid without gallstones or gallbladder wall thickening. Mild perihepatic ascites This are nonspecific findings but may be secondary to volume overload. Mild hepatomegaly. Currently low suspicion for acute appendicitis and pericholecystic fluid related to viral GI infection. GI consulted.   - outpatient GI follow-up    # History of alcohol use.   Drinks 2 alcoholic beverages a night on weeknights and 5 a day on weekends. No hx of withdrawal, intubations, ICU admissions.   - CIWA protocol while inpatient  - outpatient follow-up    # ADD (attention deficit disorder).   On home vyvanse 40mg PO qd  - c/w home med.    # Anxiety  On home klonipin 0.25mg PO qd + lexapro 5mg PO qd  - c/w home meds.    Patient was discharged to: home.    New medications: azithromycin 500mg x1 day (03/17), pantoprazole 40mg BID x2 weeks, carafate 1g q6h x2 weeks  Changes to old medications: none  Medications that were stopped: none    Items to follow up as outpatient: outpatient GI follow-up    Physical exam at the time of discharge:   GENERAL: NAD, sleeping in bed comfortably, later appears mildly uncomfortable  HEAD:  Atraumatic, normocephalic  EYES: EOMI, conjunctiva and sclera clear  HEART: Regular rate and rhythm, no murmurs, rubs, or gallops  LUNGS: Unlabored respirations.  Clear to auscultation bilaterally, no crackles, wheezing, or rhonchi  ABDOMEN: Soft, nondistended, +BS. Epigastric region and RUQ TTP. + Pabon sign  NERVOUS SYSTEM:  A&Ox3, no focal deficits  Skin: isolated maculopapular < 1mm in diameter rash in L flank, possible healing acne

## 2024-03-15 NOTE — CONSULT NOTE ADULT - ASSESSMENT
28F PMHx ADHD, s/p partial splenectomy for splenic cysts 2020, recent ED visits x 2 @ [facility] ED, presenting with severe abdominal pain, diarrhea in the setting of EPEC/EAEC, with imaging also noting mild PCCF and mild hepatomegaly. GI consulted for management recommendations.     Patient with recent history of trip to [place], returned 3/10. Had reported seeing worms in her stool and took a one time dose of mebendazole/quinfamide while in [place]. Noted resolution of her diarrhea following this dose. Then starting this past Monday, experienced diffuse pain but worst in epigastric region/lower abdomen, nausea/vomiting, which prompted to her to proceed to [facility] ED twice. On previous ED visits patient underwent imaging with CTAP on 3/13 showing hepatomegaly c/f hepatitis and RUQ US displaying mild pericholecystic fluid without gallstones or gallbladder wall thickening. Patient was discharged after pain control with opioids and with plans to be evaluated by GI. GI PCR + for EPEC and EAEC. A/t RMF for further management.     Significant Labs: WCB 7.16, Hgb 12.8, plt 179, eosinophils 50, Na 140, K 3.9, Cr 0.64, glu 104, Ca 10.1, alb 4.3, AST/ALT/ALP 32/43/51, lipase 21, HCG <1, lactate 1.1UA negative on 3/12 & 3/13, hepatitis panel negative (no HBsAg negative as well), GI PCR with EAEC and EPEC positive    US Abd 3/14: Borderline GBWT w/o PCCF or gallbladder stones, a nonspecific finding. Trace perihepatic ascites. Mild hepatomegaly.    CTAP with PO contrast 3/13: Mild GBWT and PCCF. Nonspecific mild heterogeneity of the liver with periportal edema    RUQ US 3/13: Mild pericholecystic fluid without gallstones or gallbladder wall thickening. Mild perihepatic ascites This are nonspecific findings but may be secondary to volume overload. Mild hepatomegaly    GI consulted for persistent abdominal pain in the setting of EAEC, EPEC.    #Abdominal pain  #EPEC/EAEC  #Diarrhea  28F PMHx ADHD, s/p partial splenectomy for splenic cysts 2020, recent ED visits x 2 @ [facility] ED, presenting with severe abdominal pain, diarrhea in the setting of EPEC/EAEC, with imaging also noting mild PCCF and mild hepatomegaly. GI consulted for management recommendations.     Patient with recent history of trip to [place], returned 3/10. Had reported seeing worms in her stool and took a one time dose of mebendazole/quinfamide while in [place]. Noted resolution of her diarrhea following this dose. Then starting this past Monday, experienced diffuse pain but worst in epigastric region/lower abdomen, nausea/vomiting, which prompted to her to proceed to [facility] ED twice. On previous ED visits patient underwent imaging with CTAP on 3/13 showing hepatomegaly c/f hepatitis and RUQ US displaying mild PCCF without gallstones or GBWT. Patient was discharged after pain control with opioids and with plans to be evaluated by GI. GI PCR + for EPEC and EAEC.     On this admission, WCB 7.16, Hgb 12.8, plt 179, eosinophils 50, Na 140, K 3.9, Cr 0.64, glu 104, Ca 10.1, alb 4.3, AST/ALT/ALP 32/43/51, lipase 21, HCG <1, lactate 1.1UA negative on 3/12 & 3/13, hepatitis panel negative. US Abd 3/14: Borderline GBWT w/o PCCF or gallbladder stones, a nonspecific finding. Trace perihepatic ascites. Mild hepatomegaly.    Of note, patients     #Abdominal pain  #EPEC/EAEC  #Diarrhea  28F PMHx ADHD, s/p partial splenectomy for splenic cysts 2020, recent ED visits x 2 @ [facility] ED, presenting with severe abdominal pain, diarrhea in the setting of EPEC/EAEC, with imaging also noting mild PCCF and mild hepatomegaly. GI consulted for management recommendations.     Patient with recent history of trip to [place], returned 3/10. Had reported seeing worms in her stool and took a one time dose of mebendazole/quinfamide while in [place]. Noted resolution of her diarrhea following this dose. Then starting this past Monday, experienced diffuse pain but worst in epigastric region/lower abdomen, nausea/vomiting, which prompted to her to proceed to [facility] ED twice. On previous ED visits patient underwent imaging with CTAP on 3/13 showing hepatomegaly c/f hepatitis and RUQ US displaying mild PCCF without gallstones or GBWT. Patient was discharged after pain control with opioids and with plans to be evaluated by GI. GI PCR + for EPEC and EAEC.     On this admission, WCB 7.16, Hgb 12.8, plt 179, eosinophils 50, Na 140, K 3.9, Cr 0.64, glu 104, Ca 10.1, alb 4.3, AST/ALT/ALP 32/43/51, lipase 21, HCG <1, lactate 1.1UA negative on 3/12 & 3/13, hepatitis panel negative. US Abd 3/14: Borderline GBWT w/o PCCF or gallbladder stones, a nonspecific finding. Trace perihepatic ascites. Mild hepatomegaly.    Of note, patient has been taking NSAIDs liberally more recently so ddx for epigastric pain includes PUD. Suspect remainder of symptoms of lower abdominal discomfort related to underlying gastrointestinal infection. Radiographic findings of florencia-portal edema still remains unclear. Acute hepatitis panel negative. Mild hepatomegaly noted however may also be related to acute infection. Would exclude other causes of florencia-portal edema including Ovi-Cristi Zach syndrome to further explain mild hepatomegaly noted.     #Abdominal pain  #EPEC/EAEC  #Diarrhea   -c/w PPI 40 mg BID   -Anti-emetics as per primary team  -Would avoid NSAIDs and opiates for pain control   -STD testing to exclude Ovi-Cristi Zach syndrome to exclude other potential causes for florencia-portal edema   -Agree with 3 day course of Azithromycin for EPEC/EAEC  -If epigastric abdominal pain persists through the weekend, can consider for endoscopic evaluation early next week  -Remainder of medical management as per primary team    Case discussed with hepatologist and primary team.     Wilma Philippe DO  Gastroenterology Fellow  Pager: [phone] 28F PMHx ADHD, s/p partial splenectomy for splenic cysts 2020, recent ED visits x 2 @ [facility] ED, presenting with severe abdominal pain, diarrhea in the setting of EPEC/EAEC, with imaging also noting mild PCCF and mild hepatomegaly. GI consulted for management recommendations.     Patient with recent history of trip to [place], returned 3/10. Had reported seeing worms in her stool and took a one time dose of mebendazole/quinfamide while in [place]. Noted resolution of her diarrhea following this dose. Then starting this past Monday, experienced diffuse pain but worst in epigastric region/lower abdomen, nausea/vomiting, which prompted to her to proceed to [facility] ED twice. On previous ED visits patient underwent imaging with CTAP on 3/13 showing hepatomegaly c/f hepatitis and RUQ US displaying mild PCCF without gallstones or GBWT. Patient was discharged after pain control with opioids and with plans to be evaluated by GI. GI PCR + for EPEC and EAEC.     On this admission, WCB 7.16, Hgb 12.8, plt 179, eosinophils 50, Na 140, K 3.9, Cr 0.64, glu 104, Ca 10.1, alb 4.3, AST/ALT/ALP 32/43/51, lipase 21, HCG <1, lactate 1.1UA negative on 3/12 & 3/13, hepatitis panel negative. US Abd 3/14: Borderline GBWT w/o PCCF or gallbladder stones, a nonspecific finding. Trace perihepatic ascites. Mild hepatomegaly.    Of note, patient has been taking NSAIDs liberally more recently and epigastric pain worsens with PO intake so ddx for epigastric pain includes PUD (i.e. gastric ulcer). Suspect remainder of symptoms of lower abdominal discomfort related to underlying gastrointestinal infection. Radiographic findings of florencia-portal edema still remains unclear. Acute hepatitis panel negative. Mild hepatomegaly noted however may also be related to acute infection. Would exclude other causes of florencia-portal edema including Ovi-Cristi Zach syndrome to further explain mild hepatomegaly noted.     #Abdominal pain  #EPEC/EAEC  #Diarrhea   -c/w PPI 40 mg BID   -Anti-emetics as per primary team  -Would avoid NSAIDs and opiates for pain control   -STD testing to exclude Ovi-Cristi Zach syndrome to exclude other potential causes for florencia-portal edema   -Agree with 3 day course of Azithromycin for EPEC/EAEC  -If epigastric abdominal pain persists through the weekend, can consider for endoscopic evaluation early next week  -Remainder of medical management as per primary team    Case discussed with hepatologist and primary team.     Wilma Philippe DO  Gastroenterology Fellow  Pager: [phone] 28F PMHx ADHD, s/p partial splenectomy for splenic cysts 2020, recent ED visits x 2 @ [facility] ED, presenting with severe abdominal pain, diarrhea in the setting of EPEC/EAEC, with imaging also noting mild PCCF and mild hepatomegaly. GI consulted for management recommendations.     Patient with recent history of trip to [place], returned 3/10. Had reported seeing worms in her stool and took a one time dose of mebendazole/quinfamide while in [place]. Noted resolution of her diarrhea following this dose. Then starting this past Monday, experienced diffuse pain but worst in epigastric region/lower abdomen, nausea/vomiting, which prompted to her to proceed to [facility] ED twice. On previous ED visits patient underwent imaging with CTAP on 3/13 showing hepatomegaly c/f hepatitis and RUQ US displaying mild PCCF without gallstones or GBWT. Patient was discharged after pain control with opioids and with plans to be evaluated by GI. GI PCR + for EPEC and EAEC.     On this admission, WCB 7.16, Hgb 12.8, plt 179, eosinophils 50, Na 140, K 3.9, Cr 0.64, glu 104, Ca 10.1, alb 4.3, AST/ALT/ALP 32/43/51, lipase 21, HCG <1, lactate 1.1UA negative on 3/12 & 3/13, hepatitis panel negative. US Abd 3/14: Borderline GBWT w/o PCCF or gallbladder stones, a nonspecific finding. Trace perihepatic ascites. Mild hepatomegaly.    Of note, patient has been taking NSAIDs liberally more recently and epigastric pain worsens with PO intake so ddx for epigastric pain includes PUD (i.e. gastric ulcer). Suspect remainder of symptoms of lower abdominal discomfort related to underlying gastrointestinal infection. Radiographic findings of florencia-portal edema still remains unclear. Acute hepatitis panel negative. Mild hepatomegaly noted however may also be related to acute infection. Would exclude other causes of florencia-portal edema including Ovi-Cristi Zach syndrome to further explain mild hepatomegaly noted.     #Abdominal pain  #EPEC/EAEC  #Diarrhea   -c/w PPI 40 mg BID   -Anti-emetics as per primary team  -Would avoid NSAIDs and opiates for pain control   -STI testing to exclude Ovi-Cristi Zach syndrome to exclude other potential causes for florencia-portal edema   -Agree with 3 day course of Azithromycin for EPEC/EAEC  -If epigastric abdominal pain persists through the weekend, can consider for endoscopic evaluation early next week  -Please obtain abdominal US with duplex   -Remainder of medical management as per primary team    Case discussed with hepatologist and primary team.     Wilma Philippe DO  Gastroenterology Fellow  Pager: [phone]

## 2024-03-15 NOTE — CONSULT NOTE ADULT - SUBJECTIVE AND OBJECTIVE BOX
HPI:  28F with history of ADHD, s/p partial splenectomy 2020, recent ED visits x 2 @ [facility] ED including yesterday, presenting with severe abdominal pain. Patient states to have recently returned from [place], with questionable parasitiv infection. Pateint reportedly saw worms? in stool, has been taking anti-parasite medicine (mebendazole/quinfamide) purchased in [facility], has had diffuse pain but worst in epigastric region/lower abdomen, seen @ [facility] twice in past 2 days. Diarrhea apparently started 7 days ago (when patient observed worms in stool). On eval at [facility] on 3/12, epigastric abdominal pain associated with nausea and NBNB vomiting one day in duration. Patient returned from [facility] on 3/10. Patient reports to have felt better after taking antiparasitic in [facility] then worsened upon return to [place]. On previous ED visitis patient underwent imaging with CTAP on 3/13 showing hepatomegaly c/f hepatitis and RUQ US displaying mild pericholecystic fluid without gallstones or gallbladder wall thickening. Patient was discharged after pain control with opioids and with plans to be evaluated by GI. On presentation today, mother at bedside brought stool sample and subsequently tested positive via GI PCR for EPEC and EAEC. A/t RMF for further management.     12/2020: severe left upper quadrant pain.  She had multiple imaging studies, which revealed an approximately 6cm cyst in the superior and posterior pole of the spleen that had findings consistent with hemorrhage in the cyst.  Given that she was severely symptomatic, we elected to do a cyst fenestration.    ED COURSE:   Vitals: T 97.5F, HR 68, /79, RR 16, SpO2 100%     Significant Labs: WCB 7.16, Hgb 12.8, plt 179, eosinophils 50, Na 140, K 3.9, Cr 0.64, glu 104, Ca 10.1, alb 4.3, AST/ALT/ALP 32/43/51, lipase 21, HCG <1, lactate 1.1UA negative on 3/12 & 3/13, hepatitis panel negative (no HBsAg negative as well), GI PCR with EAEC and EPEC positive    EKG: not performed    US Abd 3/14: Borderline gallbladder wall thickening without pericholecystic fluid or gallbladder stones, a nonspecific finding. Trace perihepatic ascites. Mild hepatomegaly.    CTAP with PO contrast 3/13: Mild gallbladder wall thickening and pericholecystic fluid. Recommend additional evaluation with right upper quadrant ultrasound. Nonspecific mild heterogeneity of the liver with periportal edema and please correlate clinically for hepatitis    RUQ US 3/13: Mild pericholecystic fluid without gallstones or gallbladder wall thickening. Mild perihepatic ascites This are nonspecific findings but may be secondary to volume overload. Mild hepatomegaly    Interventions: tylenol 1g IVPB, famotidine 20mg IVP, ketorolac 15 IVP, morphine 4mg x2, pantoprazole 40mg IVP, 1L NS   (14 Mar 2024 23:54)    GI consulted for persistent abdominal pain in the setting of EAEC, EPEC.    Allergies    No Known Allergies    Intolerances      MEDICATIONS:  MEDICATIONS  (STANDING):  azithromycin   Tablet 500 milliGRAM(s) Oral every 24 hours  clonazePAM  Tablet 0.25 milliGRAM(s) Oral daily  escitalopram 5 milliGRAM(s) Oral daily  influenza   Vaccine 0.5 milliLiter(s) IntraMuscular once  pantoprazole    Tablet 40 milliGRAM(s) Oral before breakfast  sucralfate 1 Gram(s) Oral every 6 hours    MEDICATIONS  (PRN):  acetaminophen     Tablet .. 650 milliGRAM(s) Oral every 6 hours PRN Temp greater or equal to 38C (100.4F), Mild Pain (1 - 3)  LORazepam   Injectable 2 milliGRAM(s) IntraMuscular every 1 hour PRN CIWA-Ar score 8 or greater  ondansetron Injectable 4 milliGRAM(s) IV Push every 8 hours PRN Nausea and/or Vomiting  oxyCODONE    IR 5 milliGRAM(s) Oral every 6 hours PRN Moderate Pain (4 - 6)    PAST MEDICAL & SURGICAL HISTORY:  Anxiety      ADD (attention deficit disorder)      Cyst of spleen      No pertinent past medical history      S/P sinus surgery  balloon sinuplasty 2019        FAMILY HISTORY:    SOCIAL HISTORY:  [place]: [ ] Current, [ ] Former, [ ] Never; Pack Years:  Alcohol:  Illicit Drugs:    REVIEW OF SYSTEMS:  Constitutional: No fever, chills, weight loss, or fatigue  ENMT:  No visual changes; No difficulty hearing, tinnitus, vertigo; No sinus or throat pain  Neck: No pain or stiffness  Respiratory: No cough, wheezing, or hemoptysis; No shortness of breath  Cardiovascular: No chest pain, palpitations, dizziness, orthopnea, PND, or leg swelling  Gastrointestinal: No abdominal or epigastric pain. No  nausea, vomiting, or hematemesis. No diarrhea, constipation, or steatorrhea. No melena or hematochezia  Genitourinary: No dysuria, urinary frequency/hesitancy, or hematuria  Skin: No itching, burning, rashes or lesions   Musculoskeletal: No joint pain or swelling; No muscle, back or extremity pain    Vital Signs Last 24 Hrs  T(C): 36.4 (15 Mar 2024 12:14), Max: 36.8 (14 Mar 2024 22:45)  T(F): 97.6 (15 Mar 2024 12:14), Max: 98.2 (14 Mar 2024 22:45)  HR: 63 (15 Mar 2024 12:14) (56 - 68)  BP: 104/63 (15 Mar 2024 12:14) (102/56 - 123/79)  BP(mean): --  RR: 15 (15 Mar 2024 12:14) (15 - 17)  SpO2: 97% (15 Mar 2024 12:14) (97% - 100%)    Parameters below as of 15 Mar 2024 12:14  Patient On (Oxygen Delivery Method): room air        03-14 @ 07:01  -  03-15 @ 07:00  --------------------------------------------------------  IN: 12 mL / OUT: 0 mL / NET: 12 mL    03-15 @ 07:01  -  03-15 @ 13:51  --------------------------------------------------------  IN: 350 mL / OUT: 800 mL / NET: -450 mL        PHYSICAL EXAM:    General: female, lying in bed; mother at bedside; in no acute distress  HEENT: MMM, conjunctiva and sclera clear  Gastrointestinal: Soft, non-distended; epigastric tenderness; Normal bowel sounds; No rebound or guarding  Extremities: Normal range of motion, No clubbing, cyanosis or edema  Neurological: Alert and oriented x3  Skin: Warm and dry. No obvious rash    LABS:                        12.8   7.16  )-----------( 179      ( 14 Mar 2024 19:21 )             37.8     03-14    140  |  103  |  5<L>  ----------------------------<  104<H>  3.9   |  28  |  0.64    Ca    10.1      14 Mar 2024 19:21    TPro  5.7<L>  /  Alb  3.8  /  TBili  <0.2  /  DBili  <0.2  /  AST  17  /  ALT  27  /  AlkPhos  43  03-15      Culture Results:   Testing in progress (03-14 @ 19:31)    RADIOLOGY & ADDITIONAL STUDIES:    HPI:  28F with history of ADHD, s/p partial splenectomy 2020, recent ED visits x 2 @ [facility] ED including yesterday, presenting with severe abdominal pain. Patient states to have recently returned from [place], with questionable parasitiv infection. Pateint reportedly saw worms? in stool, has been taking anti-parasite medicine (mebendazole/quinfamide) purchased in [place], has had diffuse pain but worst in epigastric region/lower abdomen, seen @ [facility] twice in past 2 days. Diarrhea apparently started 7 days ago (when patient observed worms in stool). On eval at [facility] on 3/12, epigastric abdominal pain associated with nausea and NBNB vomiting one day in duration. Patient returned from [place] on 3/10. Patient reports to have felt better after taking antiparasitic in [place] then worsened upon return to [place]. On previous ED visits patient underwent imaging with CTAP on 3/13 showing hepatomegaly c/f hepatitis and RUQ US displaying mild pericholecystic fluid without gallstones or gallbladder wall thickening. Patient was discharged after pain control with opioids and with plans to be evaluated by GI. On presentation today, mother at bedside brought stool sample and subsequently tested positive via GI PCR for EPEC and EAEC. A/t RMF for further management.     12/2020: severe left upper quadrant pain.  She had multiple imaging studies, which revealed an approximately 6cm cyst in the superior and posterior pole of the spleen that had findings consistent with hemorrhage in the cyst.  Given that she was severely symptomatic, we elected to do a cyst fenestration.    ED COURSE:   Vitals: T 97.5F, HR 68, /79, RR 16, SpO2 100%     Significant Labs: WCB 7.16, Hgb 12.8, plt 179, eosinophils 50, Na 140, K 3.9, Cr 0.64, glu 104, Ca 10.1, alb 4.3, AST/ALT/ALP 32/43/51, lipase 21, HCG <1, lactate 1.1UA negative on 3/12 & 3/13, hepatitis panel negative (no HBsAg negative as well), GI PCR with EAEC and EPEC positive    EKG: not performed    US Abd 3/14: Borderline gallbladder wall thickening without pericholecystic fluid or gallbladder stones, a nonspecific finding. Trace perihepatic ascites. Mild hepatomegaly.    CTAP with PO contrast 3/13: Mild gallbladder wall thickening and pericholecystic fluid. Recommend additional evaluation with right upper quadrant ultrasound. Nonspecific mild heterogeneity of the liver with periportal edema and please correlate clinically for hepatitis    RUQ US 3/13: Mild pericholecystic fluid without gallstones or gallbladder wall thickening. Mild perihepatic ascites This are nonspecific findings but may be secondary to volume overload. Mild hepatomegaly    Interventions: tylenol 1g IVPB, famotidine 20mg IVP, ketorolac 15 IVP, morphine 4mg x2, pantoprazole 40mg IVP, 1L NS    GI consulted for persistent abdominal pain in the setting of EAEC, EPEC.    Allergies    No Known Allergies    Intolerances      MEDICATIONS:  MEDICATIONS  (STANDING):  azithromycin   Tablet 500 milliGRAM(s) Oral every 24 hours  clonazePAM  Tablet 0.25 milliGRAM(s) Oral daily  escitalopram 5 milliGRAM(s) Oral daily  influenza   Vaccine 0.5 milliLiter(s) IntraMuscular once  pantoprazole    Tablet 40 milliGRAM(s) Oral before breakfast  sucralfate 1 Gram(s) Oral every 6 hours    MEDICATIONS  (PRN):  acetaminophen     Tablet .. 650 milliGRAM(s) Oral every 6 hours PRN Temp greater or equal to 38C (100.4F), Mild Pain (1 - 3)  LORazepam   Injectable 2 milliGRAM(s) IntraMuscular every 1 hour PRN CIWA-Ar score 8 or greater  ondansetron Injectable 4 milliGRAM(s) IV Push every 8 hours PRN Nausea and/or Vomiting  oxyCODONE    IR 5 milliGRAM(s) Oral every 6 hours PRN Moderate Pain (4 - 6)    PAST MEDICAL & SURGICAL HISTORY:  Anxiety      ADD (attention deficit disorder)      Cyst of spleen      No pertinent past medical history      S/P sinus surgery  balloon sinuplasty 2019        FAMILY HISTORY:    SOCIAL HISTORY:  [place]: [ ] Current, [ ] Former, [ ] Never; Pack Years:  Alcohol:  Illicit Drugs:    REVIEW OF SYSTEMS:  Constitutional: No fever, chills, weight loss, or fatigue  ENMT:  No visual changes; No difficulty hearing, tinnitus, vertigo; No sinus or throat pain  Neck: No pain or stiffness  Respiratory: No cough, wheezing, or hemoptysis; No shortness of breath  Cardiovascular: No chest pain, palpitations, dizziness, orthopnea, PND, or leg swelling  Gastrointestinal: No abdominal or epigastric pain. No  nausea, vomiting, or hematemesis. No diarrhea, constipation, or steatorrhea. No melena or hematochezia  Genitourinary: No dysuria, urinary frequency/hesitancy, or hematuria  Skin: No itching, burning, rashes or lesions   Musculoskeletal: No joint pain or swelling; No muscle, back or extremity pain    Vital Signs Last 24 Hrs  T(C): 36.4 (15 Mar 2024 12:14), Max: 36.8 (14 Mar 2024 22:45)  T(F): 97.6 (15 Mar 2024 12:14), Max: 98.2 (14 Mar 2024 22:45)  HR: 63 (15 Mar 2024 12:14) (56 - 68)  BP: 104/63 (15 Mar 2024 12:14) (102/56 - 123/79)  BP(mean): --  RR: 15 (15 Mar 2024 12:14) (15 - 17)  SpO2: 97% (15 Mar 2024 12:14) (97% - 100%)    Parameters below as of 15 Mar 2024 12:14  Patient On (Oxygen Delivery Method): room air        03-14 @ 07:01  -  03-15 @ 07:00  --------------------------------------------------------  IN: 12 mL / OUT: 0 mL / NET: 12 mL    03-15 @ 07:01  -  03-15 @ 13:51  --------------------------------------------------------  IN: 350 mL / OUT: 800 mL / NET: -450 mL        PHYSICAL EXAM:    General: female, lying in bed; mother at bedside; in no acute distress  HEENT: MMM, conjunctiva and sclera clear  Gastrointestinal: Soft, non-distended; epigastric tenderness; Normal bowel sounds; No rebound or guarding  Extremities: Normal range of motion, No clubbing, cyanosis or edema  Neurological: Alert and oriented x3  Skin: Warm and dry. No obvious rash    LABS:                        12.8   7.16  )-----------( 179      ( 14 Mar 2024 19:21 )             37.8     03-14    140  |  103  |  5<L>  ----------------------------<  104<H>  3.9   |  28  |  0.64    Ca    10.1      14 Mar 2024 19:21    TPro  5.7<L>  /  Alb  3.8  /  TBili  <0.2  /  DBili  <0.2  /  AST  17  /  ALT  27  /  AlkPhos  43  03-15      Culture Results:   Testing in progress (03-14 @ 19:31)    RADIOLOGY & ADDITIONAL STUDIES:

## 2024-03-15 NOTE — DISCHARGE NOTE PROVIDER - NSDCCPCAREPLAN_GEN_ALL_CORE_FT
PRINCIPAL DISCHARGE DIAGNOSIS  Diagnosis: Abdominal pain  Assessment and Plan of Treatment:      PRINCIPAL DISCHARGE DIAGNOSIS  Diagnosis: Abdominal pain  Assessment and Plan of Treatment: You presented to the ER for recent traveler's diarrhea and gastritis (inflammation of your stomach) caused by two kinds of E. coli bacteria found in your stool. We gave you IV fluids and medications to manage your pain, and started you on a three-day course of antibiotics. After discharge, CONTINUE TO TAKE YOUR ORAL AZITHROMYCIN ___MG TABLET FOR TWO DAYS (UNTIL 3/17). Continue to drink lots of fluids to hydrate and return to the ER if you start having diarrhea again or cannot keep liquids down. Make sure you follow up with the gastroenterologist oupatient.     PRINCIPAL DISCHARGE DIAGNOSIS  Diagnosis: Abdominal pain  Assessment and Plan of Treatment: You presented to the ER for recent traveler's diarrhea and gastroenteritis caused by two kinds of E. coli bacteria found in your stool. We gave you IV fluids and medications to manage your pain, and started you on a three-day course of antibiotics. On discharge, please:  - CONTINUE TO TAKE YOUR ORAL AZITHROMYCIN ___MG TABLET FOR TWO DAYS (UNTIL 3/17).   - **CONTINUE PPI?**  - **ADD GI RECS**  - Continue to drink lots of fluids to hydrate and return to the ER if you start having diarrhea again or cannot keep liquids down  - Please follow up with a gastroenterologist, we made an appointment for you     PRINCIPAL DISCHARGE DIAGNOSIS  Diagnosis: Abdominal pain  Assessment and Plan of Treatment: You presented to the ER for recent traveler's diarrhea and gastroenteritis caused by two kinds of E. coli bacteria found in your stool. We gave you IV fluids and medications to manage your pain, and started you on a three-day course of antibiotics. On discharge, please:  - CONTINUE TO TAKE YOUR ORAL AZITHROMYCIN 500 MG TABLET FOR ONE ADDITIONAL DAY (3/17).   - CONTINUE PANTOPRAZOLE 40MG TWICE DAILY UNTIL YOUR GI FOLLOW-UP APPOINTMENT  - CONTINUE CARAFATE 1G EVERY 6 HOURS DAILY UNTIL YOUR GI FOLLOW-UP APPOINTMENT  - Continue to drink lots of fluids to hydrate and return to the ER if you start having diarrhea again or cannot keep liquids down  - Please follow up with a gastroenterologist, we made an appointment for you on 03/27/24 with Dr. Cantu.

## 2024-03-15 NOTE — DISCHARGE NOTE PROVIDER - ATTENDING DISCHARGE PHYSICAL EXAMINATION:
28F with PMH of partial splenectomy and ADHD presenting with abdominal pain, diarrhea and EPEC/EAEC gastroenteritis.  Admitted for further treatment/management.      Physical exam  General: experiencing some pain, sitting up in bed  heent: ncat, mmm  cards: rrr, normal S1S2, no mrg  pulm: ctab, no wheeze, crackles, rales or rhonchi  ab: soft, epigastric tenderness, no ttp of RLQ or LLQ, normoactive bowel sounds  ext: wwp, no edema, no calf asymmetry  neuro: speech is fluent, no facial asymmetry, follows commands, no acute deficits noted  skin: warm and dry, no obvious rashes or lesions     Plan  - GI consult  - continue on azithromycin  - diarrhea has stopped at this point, and patient is able to tolerate some PO.  She reports she has been urinating without any difficulty.   - rest as above

## 2024-03-15 NOTE — PROGRESS NOTE ADULT - PROBLEM SELECTOR PLAN 1
7 days in duration with questionable worms in stool s/p [place] trip and mebendazole treatment. patient denies blood in stool hgb stable, GI PCR with EPEC and EAEC. CTAP w/o bowel pathology noted. Given hx of NBNB vomiting and daily use of marijuana. canabis related emesis unlikely given emesis has subsided. physical exam also notable for radiation to lower quadrants and CVA tenderness (UA negative) additional imaging to r/o ovarian torsion to be performed, nephrolithiasis unlikely given Kidneys and  were WNL on CTAP 3/13. UTox +opioids, otherwise unremarkable. Pain improved w/ oxy 5mg q6h PRN.  - start azithromycin 500 q24h x3d  - start ppi, Carafate   - pain management   - consider GI consult in AM  - zofran PRN   - f/u stool O&P  - f/u pelvic US to r/o ovarian torsion 7 days in duration with questionable worms in stool s/p [place] trip and mebendazole treatment. patient denies blood in stool hgb stable, GI PCR with EPEC and EAEC. CTAP w/o bowel pathology noted. Given hx of NBNB vomiting and daily use of marijuana. canabis related emesis unlikely given emesis has subsided. physical exam also notable for radiation to lower quadrants and CVA tenderness (UA negative) additional imaging to r/o ovarian torsion to be performed, nephrolithiasis unlikely given Kidneys and  were WNL on CTAP 3/13. UTox +opioids, otherwise unremarkable. Pain controlled w/ oxy 5mg q6h PRN.  - start azithromycin 500 q24h x3d  - start ppi, Carafate   - pain management   - consider GI consult in AM  - zofran PRN   - f/u stool O&P  - f/u pelvic US to r/o ovarian torsion 7 days in duration with questionable worms in stool s/p [place] trip and mebendazole treatment. patient denies blood in stool hgb stable, GI PCR with EPEC and EAEC. CTAP w/o bowel pathology noted. Given hx of NBNB vomiting and daily use of marijuana. canabis related emesis unlikely given emesis has subsided. physical exam also notable for radiation to lower quadrants and CVA tenderness (UA negative) additional imaging to r/o ovarian torsion to be performed, nephrolithiasis unlikely given Kidneys and  were WNL on CTAP 3/13. UTox +opioids, otherwise unremarkable. Pain controlled w/ oxy 5mg q6h PRN.  - c/w azithromycin 500 q24h x3d (03/15-03/17)  - c/w PPI, Carafate   - pain management   - f/u GI recs  - zofran PRN   - f/u stool O&P  - f/u pelvic US to r/o ovarian torsion

## 2024-03-16 ENCOUNTER — TRANSCRIPTION ENCOUNTER (OUTPATIENT)
Age: 29
End: 2024-03-16

## 2024-03-16 VITALS
SYSTOLIC BLOOD PRESSURE: 101 MMHG | RESPIRATION RATE: 14 BRPM | HEART RATE: 62 BPM | OXYGEN SATURATION: 97 % | DIASTOLIC BLOOD PRESSURE: 63 MMHG | TEMPERATURE: 98 F

## 2024-03-16 LAB
ALBUMIN SERPL ELPH-MCNC: 3.8 G/DL — SIGNIFICANT CHANGE UP (ref 3.3–5)
ALP SERPL-CCNC: 42 U/L — SIGNIFICANT CHANGE UP (ref 40–120)
ALT FLD-CCNC: 22 U/L — SIGNIFICANT CHANGE UP (ref 10–45)
ANION GAP SERPL CALC-SCNC: 7 MMOL/L — SIGNIFICANT CHANGE UP (ref 5–17)
ANISOCYTOSIS BLD QL: SLIGHT — SIGNIFICANT CHANGE UP
AST SERPL-CCNC: 16 U/L — SIGNIFICANT CHANGE UP (ref 10–40)
BASOPHILS # BLD AUTO: 0.04 K/UL — SIGNIFICANT CHANGE UP (ref 0–0.2)
BASOPHILS NFR BLD AUTO: 0.9 % — SIGNIFICANT CHANGE UP (ref 0–2)
BILIRUB SERPL-MCNC: 0.4 MG/DL — SIGNIFICANT CHANGE UP (ref 0.2–1.2)
BLD GP AB SCN SERPL QL: NEGATIVE — SIGNIFICANT CHANGE UP
BUN SERPL-MCNC: 5 MG/DL — LOW (ref 7–23)
CALCIUM SERPL-MCNC: 9 MG/DL — SIGNIFICANT CHANGE UP (ref 8.4–10.5)
CHLORIDE SERPL-SCNC: 102 MMOL/L — SIGNIFICANT CHANGE UP (ref 96–108)
CO2 SERPL-SCNC: 29 MMOL/L — SIGNIFICANT CHANGE UP (ref 22–31)
CREAT SERPL-MCNC: 0.68 MG/DL — SIGNIFICANT CHANGE UP (ref 0.5–1.3)
CULTURE RESULTS: SIGNIFICANT CHANGE UP
EGFR: 122 ML/MIN/1.73M2 — SIGNIFICANT CHANGE UP
EOSINOPHIL # BLD AUTO: 0.04 K/UL — SIGNIFICANT CHANGE UP (ref 0–0.5)
EOSINOPHIL NFR BLD AUTO: 0.9 % — SIGNIFICANT CHANGE UP (ref 0–6)
GLUCOSE SERPL-MCNC: 91 MG/DL — SIGNIFICANT CHANGE UP (ref 70–99)
HCT VFR BLD CALC: 34.8 % — SIGNIFICANT CHANGE UP (ref 34.5–45)
HGB BLD-MCNC: 12 G/DL — SIGNIFICANT CHANGE UP (ref 11.5–15.5)
LYMPHOCYTES # BLD AUTO: 1.44 K/UL — SIGNIFICANT CHANGE UP (ref 1–3.3)
LYMPHOCYTES # BLD AUTO: 31.3 % — SIGNIFICANT CHANGE UP (ref 13–44)
MAGNESIUM SERPL-MCNC: 1.9 MG/DL — SIGNIFICANT CHANGE UP (ref 1.6–2.6)
MANUAL SMEAR VERIFICATION: SIGNIFICANT CHANGE UP
MCHC RBC-ENTMCNC: 32.2 PG — SIGNIFICANT CHANGE UP (ref 27–34)
MCHC RBC-ENTMCNC: 34.5 GM/DL — SIGNIFICANT CHANGE UP (ref 32–36)
MCV RBC AUTO: 93.3 FL — SIGNIFICANT CHANGE UP (ref 80–100)
MONOCYTES # BLD AUTO: 0.24 K/UL — SIGNIFICANT CHANGE UP (ref 0–0.9)
MONOCYTES NFR BLD AUTO: 5.2 % — SIGNIFICANT CHANGE UP (ref 2–14)
NEUTROPHILS # BLD AUTO: 2.4 K/UL — SIGNIFICANT CHANGE UP (ref 1.8–7.4)
NEUTROPHILS NFR BLD AUTO: 52.2 % — SIGNIFICANT CHANGE UP (ref 43–77)
OVALOCYTES BLD QL SMEAR: SLIGHT — SIGNIFICANT CHANGE UP
PHOSPHATE SERPL-MCNC: 4 MG/DL — SIGNIFICANT CHANGE UP (ref 2.5–4.5)
PLAT MORPH BLD: ABNORMAL
PLATELET # BLD AUTO: 171 K/UL — SIGNIFICANT CHANGE UP (ref 150–400)
POTASSIUM SERPL-MCNC: 4.5 MMOL/L — SIGNIFICANT CHANGE UP (ref 3.5–5.3)
POTASSIUM SERPL-SCNC: 4.5 MMOL/L — SIGNIFICANT CHANGE UP (ref 3.5–5.3)
PROT SERPL-MCNC: 5.8 G/DL — LOW (ref 6–8.3)
RBC # BLD: 3.73 M/UL — LOW (ref 3.8–5.2)
RBC # FLD: 11.9 % — SIGNIFICANT CHANGE UP (ref 10.3–14.5)
RBC BLD AUTO: ABNORMAL
RH IG SCN BLD-IMP: POSITIVE — SIGNIFICANT CHANGE UP
SODIUM SERPL-SCNC: 138 MMOL/L — SIGNIFICANT CHANGE UP (ref 135–145)
SPECIMEN SOURCE: SIGNIFICANT CHANGE UP
VARIANT LYMPHS # BLD: 9.5 % — HIGH (ref 0–6)
WBC # BLD: 4.59 K/UL — SIGNIFICANT CHANGE UP (ref 3.8–10.5)
WBC # FLD AUTO: 4.59 K/UL — SIGNIFICANT CHANGE UP (ref 3.8–10.5)

## 2024-03-16 PROCEDURE — 86900 BLOOD TYPING SEROLOGIC ABO: CPT

## 2024-03-16 PROCEDURE — 87507 IADNA-DNA/RNA PROBE TQ 12-25: CPT

## 2024-03-16 PROCEDURE — 86850 RBC ANTIBODY SCREEN: CPT

## 2024-03-16 PROCEDURE — 84100 ASSAY OF PHOSPHORUS: CPT

## 2024-03-16 PROCEDURE — 86901 BLOOD TYPING SEROLOGIC RH(D): CPT

## 2024-03-16 PROCEDURE — 93975 VASCULAR STUDY: CPT

## 2024-03-16 PROCEDURE — 80053 COMPREHEN METABOLIC PANEL: CPT

## 2024-03-16 PROCEDURE — 80076 HEPATIC FUNCTION PANEL: CPT

## 2024-03-16 PROCEDURE — 99285 EMERGENCY DEPT VISIT HI MDM: CPT | Mod: 25

## 2024-03-16 PROCEDURE — 74018 RADEX ABDOMEN 1 VIEW: CPT

## 2024-03-16 PROCEDURE — 87177 OVA AND PARASITES SMEARS: CPT

## 2024-03-16 PROCEDURE — 87389 HIV-1 AG W/HIV-1&-2 AB AG IA: CPT

## 2024-03-16 PROCEDURE — 83690 ASSAY OF LIPASE: CPT

## 2024-03-16 PROCEDURE — 96374 THER/PROPH/DIAG INJ IV PUSH: CPT

## 2024-03-16 PROCEDURE — 81003 URINALYSIS AUTO W/O SCOPE: CPT

## 2024-03-16 PROCEDURE — 83605 ASSAY OF LACTIC ACID: CPT

## 2024-03-16 PROCEDURE — 76705 ECHO EXAM OF ABDOMEN: CPT

## 2024-03-16 PROCEDURE — 87563 M. GENITALIUM AMP PROBE: CPT

## 2024-03-16 PROCEDURE — 96375 TX/PRO/DX INJ NEW DRUG ADDON: CPT

## 2024-03-16 PROCEDURE — 87591 N.GONORRHOEAE DNA AMP PROB: CPT

## 2024-03-16 PROCEDURE — 80307 DRUG TEST PRSMV CHEM ANLYZR: CPT

## 2024-03-16 PROCEDURE — 87798 DETECT AGENT NOS DNA AMP: CPT

## 2024-03-16 PROCEDURE — 76830 TRANSVAGINAL US NON-OB: CPT

## 2024-03-16 PROCEDURE — 87491 CHLMYD TRACH DNA AMP PROBE: CPT

## 2024-03-16 PROCEDURE — 83735 ASSAY OF MAGNESIUM: CPT

## 2024-03-16 PROCEDURE — 36415 COLL VENOUS BLD VENIPUNCTURE: CPT

## 2024-03-16 PROCEDURE — 84702 CHORIONIC GONADOTROPIN TEST: CPT

## 2024-03-16 PROCEDURE — 85025 COMPLETE CBC W/AUTO DIFF WBC: CPT

## 2024-03-16 PROCEDURE — 80074 ACUTE HEPATITIS PANEL: CPT

## 2024-03-16 PROCEDURE — 93005 ELECTROCARDIOGRAM TRACING: CPT

## 2024-03-16 PROCEDURE — 99239 HOSP IP/OBS DSCHRG MGMT >30: CPT

## 2024-03-16 PROCEDURE — 76856 US EXAM PELVIC COMPLETE: CPT

## 2024-03-16 RX ORDER — ACETAMINOPHEN 500 MG
2 TABLET ORAL
Qty: 0 | Refills: 0 | DISCHARGE

## 2024-03-16 RX ORDER — SUCRALFATE 1 G
1 TABLET ORAL
Qty: 56 | Refills: 0
Start: 2024-03-16 | End: 2024-03-29

## 2024-03-16 RX ORDER — AZITHROMYCIN 500 MG/1
1 TABLET, FILM COATED ORAL
Qty: 1 | Refills: 0
Start: 2024-03-16 | End: 2024-03-16

## 2024-03-16 RX ORDER — PANTOPRAZOLE SODIUM 20 MG/1
1 TABLET, DELAYED RELEASE ORAL
Qty: 28 | Refills: 0
Start: 2024-03-16 | End: 2024-03-29

## 2024-03-16 RX ADMIN — PANTOPRAZOLE SODIUM 40 MILLIGRAM(S): 20 TABLET, DELAYED RELEASE ORAL at 07:08

## 2024-03-16 RX ADMIN — Medication 650 MILLIGRAM(S): at 07:08

## 2024-03-16 RX ADMIN — Medication 1 GRAM(S): at 11:28

## 2024-03-16 RX ADMIN — Medication 650 MILLIGRAM(S): at 08:08

## 2024-03-16 RX ADMIN — ESCITALOPRAM OXALATE 5 MILLIGRAM(S): 10 TABLET, FILM COATED ORAL at 11:28

## 2024-03-16 RX ADMIN — Medication 1 GRAM(S): at 07:08

## 2024-03-16 RX ADMIN — AZITHROMYCIN 500 MILLIGRAM(S): 500 TABLET, FILM COATED ORAL at 11:29

## 2024-03-16 RX ADMIN — Medication 1 GRAM(S): at 01:32

## 2024-03-16 NOTE — PROGRESS NOTE ADULT - PROBLEM SELECTOR PLAN 5
on home klonipin 0.25mg PO qd + lexapro 5mg PO qd  - c/w home meds
on home klonipin 0.25mg PO qd + lexapro 5mg PO qd  - c/w home meds

## 2024-03-16 NOTE — PROGRESS NOTE ADULT - PROBLEM SELECTOR PLAN 2
mildly uptrending AST/ALT over course of last 3 days (multiple ED visits. Pabon sign positive on exam, RUQ US with Mild pericholecystic fluid without gallstones or gallbladder wall thickening. Mild perihepatic ascites This are nonspecific findings but may be secondary to volume overload. Mild hepatomegaly. Currently low suspicion for acute appendicitis and pericholecystic fluid related to viral GI infection.   - c/t monitor for symptoms  - f/u GI recs

## 2024-03-16 NOTE — PROGRESS NOTE ADULT - PROBLEM SELECTOR PLAN 1
7 days in duration with questionable worms in stool s/p [place] trip and mebendazole treatment. patient denies blood in stool hgb stable, GI PCR with EPEC and EAEC. CTAP w/o bowel pathology noted. Given hx of NBNB vomiting and daily use of marijuana. canabis related emesis unlikely given emesis has subsided. physical exam also notable for radiation to lower quadrants and CVA tenderness (UA negative) additional imaging to r/o ovarian torsion to be performed, nephrolithiasis unlikely given Kidneys and  were WNL on CTAP 3/13. UTox +opioids, otherwise unremarkable. Pain controlled w/ oxy 5mg q6h PRN.  - c/w azithromycin 500 q24h x3d (03/15-03/17)  - c/w PPI, Carafate   - pain management   - f/u GI recs  - zofran PRN   - f/u stool O&P  - f/u pelvic US to r/o ovarian torsion

## 2024-03-16 NOTE — DISCHARGE NOTE NURSING/CASE MANAGEMENT/SOCIAL WORK - NSDCPEFALRISK_GEN_ALL_CORE
For information on Fall & Injury Prevention, visit: [email or web address] OR  [email or web address] OR  https://www.cdc.gov/steadi/patient.html

## 2024-03-16 NOTE — PROGRESS NOTE ADULT - PROBLEM SELECTOR PROBLEM 2
Thickening of wall of gallbladder with pericholecystic fluid
Thickening of wall of gallbladder with pericholecystic fluid

## 2024-03-16 NOTE — DISCHARGE NOTE NURSING/CASE MANAGEMENT/SOCIAL WORK - PATIENT PORTAL LINK FT
You can access the FollowMyHealth Patient Portal offered by [facility] by registering at the following website: [email or web address]. By joining [facility]’s FollowMyHealth portal, you will also be able to view your health information using other applications (apps) compatible with our system.

## 2024-03-16 NOTE — PROGRESS NOTE ADULT - ASSESSMENT
27 y/o F w/ PMH of ADHD, partial splenectomy for cyst, and recent ED visits x2 @[facility] presenting w/ persistently severe abdominal pain i/s/o E. coli (EPEC/EAEC) gastroenteritis. Course c/b persistent pain. Admitted for further management.

## 2024-03-16 NOTE — PROGRESS NOTE ADULT - PROBLEM SELECTOR PLAN 3
Drinks 2 alcoholic beverages a night on weeknights and 5 a day on weekends. No hx of withdrawal, intubations, ICU admissions.   - [facility] protocol
Drinks 2 alcoholic beverages a night on weeknights and 5 a day on weekends. No hx of withdrawal, intubations, ICU admissions.   - [facility] protocol

## 2024-03-16 NOTE — PROGRESS NOTE ADULT - SUBJECTIVE AND OBJECTIVE BOX
******INCOMPLETE******    OVERNIGHT EVENTS/INTERVAL HPI:    SUBJECTIVE:     OBJECTIVE:  Vital Signs Last 24 Hrs  T(C): 36.4 (16 Mar 2024 05:42), Max: 36.4 (15 Mar 2024 12:14)  T(F): 97.6 (16 Mar 2024 05:42), Max: 97.6 (15 Mar 2024 12:14)  HR: 55 (16 Mar 2024 05:42) (54 - 63)  BP: 90/56 (16 Mar 2024 05:42) (90/56 - 104/63)  BP(mean): --  RR: 18 (16 Mar 2024 05:42) (15 - 18)  SpO2: 98% (16 Mar 2024 05:42) (97% - 100%)    Parameters below as of 16 Mar 2024 05:42  Patient On (Oxygen Delivery Method): room air      I&O's Detail    14 Mar 2024 07:01  -  15 Mar 2024 07:00  --------------------------------------------------------  IN:    Oral Fluid: 12 mL  Total IN: 12 mL    OUT:  Total OUT: 0 mL    Total NET: 12 mL      15 Mar 2024 07:01  -  16 Mar 2024 06:07  --------------------------------------------------------  IN:    Oral Fluid: 350 mL  Total IN: 350 mL    OUT:    Voided (mL): 1300 mL  Total OUT: 1300 mL    Total NET: -950 mL        Physical Exam:  GENERAL: Awake, alert and interactive, no acute distress, appears comfortable  NEURO: A&Ox4, no focal deficits, 5/5 strength in all ext, reflexes 2+ throughout, CN 2-12 intact  HEENT: Normocephalic, atraumatic, no conjunctivitis or scleral icterus, oral mucosa moist, no oral lesions noted  NECK: Supple, no LAD, no JVD, thyroid not palpable  CARDIAC: Regular rate and rhythm, +S1/S2, no murmurs/rubs/gallops  PULM: Breathing comfortably on RA, clear to auscultation bilaterally, no wheezes/rales/rhonchi  ABDOMEN: Soft, nontender, nondistended, +bs, no hepatosplenomegaly, no rebound tenderness or fluid wave, no CVA tenderness  : Deferred  MSK: Range of motion grossly intact, no back tenderness  SKIN: Warm and dry, no rashes, lesions  VASC: Cap refil < 2 sec, 2+ peripheral pulses, no edema, no LE tenderness  Psych: Appropriate affect    Medications:  MEDICATIONS  (STANDING):  acetaminophen     Tablet .. 650 milliGRAM(s) Oral every 8 hours  azithromycin   Tablet 500 milliGRAM(s) Oral every 24 hours  clonazePAM  Tablet 0.25 milliGRAM(s) Oral daily  escitalopram 5 milliGRAM(s) Oral daily  influenza   Vaccine 0.5 milliLiter(s) IntraMuscular once  pantoprazole    Tablet 40 milliGRAM(s) Oral every 12 hours  sucralfate 1 Gram(s) Oral every 6 hours    MEDICATIONS  (PRN):  LORazepam   Injectable 2 milliGRAM(s) IntraMuscular every 1 hour PRN CIWA-Ar score 8 or greater  ondansetron Injectable 4 milliGRAM(s) IV Push every 8 hours PRN Nausea and/or Vomiting      Labs:                        12.8   7.16  )-----------( 179      ( 14 Mar 2024 19:21 )             37.8     03-14    140  |  103  |  5<L>  ----------------------------<  104<H>  3.9   |  28  |  0.64    Ca    10.1      14 Mar 2024 19:21    TPro  5.7<L>  /  Alb  3.8  /  TBili  <0.2  /  DBili  <0.2  /  AST  17  /  ALT  27  /  AlkPhos  43  03-15        Urinalysis Basic - ( 14 Mar 2024 20:32 )    Color: Yellow / Appearance: Clear / S.007 / pH: x  Gluc: x / Ketone: Negative mg/dL  / Bili: Negative / Urobili: 0.2 mg/dL   Blood: x / Protein: Negative mg/dL / Nitrite: Negative   Leuk Esterase: Negative / RBC: x / WBC x   Sq Epi: x / Non Sq Epi: x / Bacteria: x          Radiology: Reviewed

## 2024-03-16 NOTE — PROGRESS NOTE ADULT - PROBLEM SELECTOR PLAN 6
F: none  E: replete PRN   N: regular (patient choses to avoid PO at this time)  GI ppx: none indicated, SCDs  DVT ppx: IMPROVE 0  Dispo: home once clinically improved

## 2024-03-18 LAB
C TRACH RRNA SPEC QL NAA+PROBE: SIGNIFICANT CHANGE UP
C TRACH RRNA SPEC QL NAA+PROBE: SIGNIFICANT CHANGE UP
N GONORRHOEA RRNA SPEC QL NAA+PROBE: SIGNIFICANT CHANGE UP
N GONORRHOEA RRNA SPEC QL NAA+PROBE: SIGNIFICANT CHANGE UP
SPECIMEN SOURCE: SIGNIFICANT CHANGE UP
SPECIMEN SOURCE: SIGNIFICANT CHANGE UP

## 2024-03-20 LAB
M GENITALIUM DNA UR QL NAA+PROBE: NEGATIVE — SIGNIFICANT CHANGE UP
M HOMINIS DNA SPEC QL NAA+PROBE: NEGATIVE — SIGNIFICANT CHANGE UP
UREAPLASMA DNA SPEC QL NAA+PROBE: POSITIVE

## 2024-03-22 DIAGNOSIS — A04.0 ENTEROPATHOGENIC ESCHERICHIA COLI INFECTION: ICD-10-CM

## 2024-03-22 DIAGNOSIS — A04.4 OTHER INTESTINAL ESCHERICHIA COLI INFECTIONS: ICD-10-CM

## 2024-03-22 DIAGNOSIS — F10.90 ALCOHOL USE, UNSPECIFIED, UNCOMPLICATED: ICD-10-CM

## 2024-03-22 DIAGNOSIS — Z79.82 LONG TERM (CURRENT) USE OF ASPIRIN: ICD-10-CM

## 2024-03-22 DIAGNOSIS — F41.9 ANXIETY DISORDER, UNSPECIFIED: ICD-10-CM

## 2024-03-22 DIAGNOSIS — F90.9 ATTENTION-DEFICIT HYPERACTIVITY DISORDER, UNSPECIFIED TYPE: ICD-10-CM

## 2024-03-27 ENCOUNTER — APPOINTMENT (OUTPATIENT)
Dept: GASTROENTEROLOGY | Facility: CLINIC | Age: 29
End: 2024-03-27

## 2024-08-22 NOTE — H&P PST ADULT - DENTITION
Detail Level: Detailed Quality 226: Preventive Care And Screening: Tobacco Use: Screening And Cessation Intervention: Patient screened for tobacco use and is an ex/non-smoker Quality 130: Documentation Of Current Medications In The Medical Record: Current Medications Documented normal

## 2025-03-13 NOTE — DISCHARGE NOTE NURSING/CASE MANAGEMENT/SOCIAL WORK - NSTRANSFERBELONGINGSDISPO_GEN_A_NUR
Problem: Chronic Conditions and Co-morbidities  Goal: Patient's chronic conditions and co-morbidity symptoms are monitored and maintained or improved  3/13/2025 1159 by Evangelina Niño RN  Outcome: Completed  Flowsheets (Taken 3/13/2025 1159)  Care Plan - Patient's Chronic Conditions and Co-Morbidity Symptoms are Monitored and Maintained or Improved:   Monitor and assess patient's chronic conditions and comorbid symptoms for stability, deterioration, or improvement   Update acute care plan with appropriate goals if chronic or comorbid symptoms are exacerbated and prevent overall improvement and discharge   Collaborate with multidisciplinary team to address chronic and comorbid conditions and prevent exacerbation or deterioration  3/13/2025 0120 by Jessica Dugan RN  Outcome: Progressing  Flowsheets (Taken 3/13/2025 0120)  Care Plan - Patient's Chronic Conditions and Co-Morbidity Symptoms are Monitored and Maintained or Improved:   Monitor and assess patient's chronic conditions and comorbid symptoms for stability, deterioration, or improvement   Collaborate with multidisciplinary team to address chronic and comorbid conditions and prevent exacerbation or deterioration   Update acute care plan with appropriate goals if chronic or comorbid symptoms are exacerbated and prevent overall improvement and discharge     Problem: Discharge Planning  Goal: Discharge to home or other facility with appropriate resources  3/13/2025 1159 by Evangelina Niño RN  Outcome: Completed  Flowsheets (Taken 3/13/2025 1159)  Discharge to home or other facility with appropriate resources:   Identify barriers to discharge with patient and caregiver   Identify discharge learning needs (meds, wound care, etc)   Arrange for needed discharge resources and transportation as appropriate  3/13/2025 0120 by Jessica Dugan, RN  Outcome: Progressing  Flowsheets (Taken 3/13/2025 0120)  Discharge to home or other facility with appropriate resources:    with patient